# Patient Record
Sex: FEMALE | Race: BLACK OR AFRICAN AMERICAN | Employment: OTHER | URBAN - METROPOLITAN AREA
[De-identification: names, ages, dates, MRNs, and addresses within clinical notes are randomized per-mention and may not be internally consistent; named-entity substitution may affect disease eponyms.]

---

## 2019-01-29 ENCOUNTER — OFFICE VISIT (OUTPATIENT)
Dept: ENDOCRINOLOGY | Age: 69
End: 2019-01-29

## 2019-01-29 VITALS
HEART RATE: 99 BPM | SYSTOLIC BLOOD PRESSURE: 116 MMHG | BODY MASS INDEX: 22.13 KG/M2 | WEIGHT: 133 LBS | DIASTOLIC BLOOD PRESSURE: 71 MMHG

## 2019-01-29 DIAGNOSIS — I10 ESSENTIAL HYPERTENSION WITH GOAL BLOOD PRESSURE LESS THAN 130/80: ICD-10-CM

## 2019-01-29 DIAGNOSIS — E10.8 TYPE 1 DIABETES MELLITUS WITH COMPLICATION (HCC): Primary | ICD-10-CM

## 2019-01-29 DIAGNOSIS — E78.5 HYPERLIPIDEMIA, UNSPECIFIED HYPERLIPIDEMIA TYPE: ICD-10-CM

## 2019-01-29 LAB — HBA1C MFR BLD HPLC: 10.1 %

## 2019-01-29 RX ORDER — FUROSEMIDE 20 MG/1
TABLET ORAL
COMMUNITY
Start: 2018-11-29

## 2019-01-29 RX ORDER — LIDOCAINE HYDROCHLORIDE 20 MG/ML
SOLUTION ORAL; TOPICAL
COMMUNITY
Start: 2018-12-17

## 2019-01-29 RX ORDER — AMLODIPINE BESYLATE 10 MG/1
TABLET ORAL
COMMUNITY
Start: 2018-11-09

## 2019-01-29 RX ORDER — ROSUVASTATIN CALCIUM 20 MG/1
TABLET, COATED ORAL
COMMUNITY
Start: 2018-12-04

## 2019-01-29 RX ORDER — GABAPENTIN 100 MG/1
CAPSULE ORAL
COMMUNITY
Start: 2018-12-28

## 2019-01-29 RX ORDER — ONDANSETRON HYDROCHLORIDE 8 MG/1
TABLET, FILM COATED ORAL
COMMUNITY
Start: 2018-12-17

## 2019-01-29 RX ORDER — HUMAN INSULIN 100 [IU]/ML
INJECTION, SUSPENSION SUBCUTANEOUS
COMMUNITY
Start: 2018-11-01

## 2019-01-29 RX ORDER — ALBUTEROL SULFATE 0.83 MG/ML
SOLUTION RESPIRATORY (INHALATION)
COMMUNITY
Start: 2018-10-29

## 2019-01-29 RX ORDER — NAPROXEN 500 MG/1
TABLET ORAL
COMMUNITY
Start: 2019-01-25

## 2019-01-29 RX ORDER — PANTOPRAZOLE SODIUM 40 MG/1
TABLET, DELAYED RELEASE ORAL
COMMUNITY
Start: 2018-11-09

## 2019-01-29 RX ORDER — IPRATROPIUM BROMIDE 0.5 MG/2.5ML
SOLUTION RESPIRATORY (INHALATION)
COMMUNITY
Start: 2018-10-29

## 2019-01-29 NOTE — PATIENT INSTRUCTIONS
Novolin 70/30 insulin:     Breakfast 7-8 AM:      If sugar is 70 - 100: Take 15 units    If sugar is 101 - 150: Take 20 units   If sugar is over 150: Take 35 units    ( NO INSULIN BETWEEN BREAKFAST AND DINNER)    Dinner  6 PM (no later): If sugar is 70 - 100: Take 10 units   If sugar is 101 - 150: Take 20 units   If sugar is over 150: Take 35 units    --------  Correction Scale:  1 unit for every 25 above 150    IF GLUCOSE IS:                 THEN TAKE:      0   Extra Unit  151-175   1   Extra Unit  176-200   2   Extra Units  201-225   3   Extra Units  226-250   4   Extra Units  251-275   5   Extra Units  276-300   6   Extra Units  301-325   7   Extra Units    Example: My planned insulin dose:    ____ Units    +    ____ Extra Correction Units  =  ____ total units to take together as one injection. (PLEASE LOG SUGARS EXACTLY WHERE THEY GO ON THE SHEET SO WE CAN BETTER ADJUST YOUR INSULIN)        Please note our new policy, you must arrive to the clinic 15 minutes before your appointment time to allow enough time for proper check-in, adequate time to spend with your doctor, and also to respect the appointment time of the next patient. Not arriving 15 minutes in advance may result in having your appointment rescheduled for the next available day/time.  ----------------------------------------------------------------------------------------------------------------------    Below you will find a glucose log sheet which you can use to record your blood sugars. Without checking and recording what your home glucose levels are, it will be difficult to make any changes to your medication dose, even when significant changes may be needed. Please feel free to use the log below to record your home glucose levels. At the very least, I would like for you to login the entire 2-3 weeks just before your visit so we can make your visit much more productive and beneficial to you.      GLUCOSE LOG SHEET:    Date Breakfast Lunch Dinner Bedtime Comments ? GLUCOSE LOG SHEET:    Date Breakfast Lunch Dinner Bedtime Comments ? GLUCOSE LOG SHEET:    Date Breakfast Lunch Dinner Bedtime Comments ?

## 2019-01-29 NOTE — PROGRESS NOTES
CONSULTATION REQUESTED BY: Nazanin Christianson MD     REASON FOR CONSULT:  Uncontrolled type 1 diabetes    CHIEF COMPLAINT: evaluation of type 1 diabetes    HISTORY OF PRESENT ILLNESS:   Lorri Riojas is a 76 y.o. female with a PMHx as noted below who presents for evaluation of uncontrolled type 1 diabetes. Diabetes History:  Diabetes was diagnosed at age 12  Family History of diabetes is positive in her brother and 2 sisters,  Last A1c prior to initial visit is not certain, today is 51.2%    Current Home Regimen:  - Novolin 70/30 insulin: 35 units breakfast, 20-35 units dinner    Review of home glucose:  AM: 300's mostly  Lunch: 200's mostly, occasional 150  Dinner: 150-300 mostly  Bedtime: 100-115 past week, occasional 200's, though following AM always back up to 300's ? * Patient has been eating dinner at 8 PM and going to bed right after. She is logging her dinnertime numbers in the bedtime section which I am sure has caused much confusion with interpretation of the data. She is also taking insulin in between lunch and dinner, further confusing the results.      Review of most recent diabetes-related labs:  Lab Results   Component Value Date    HBA1C 6.6 (H) 12/06/2010    HBA1C 10.5 (H) 06/18/2010    CHOL 270 (H) 12/06/2010    LDLC 175 (H) 12/06/2010    GFRAA >59 12/06/2010    GFRNA >59 12/06/2010    MCACR 42 (H) 06/18/2010    TSH 1.640 12/06/2010     Lab Key:  [id number] = IA-2 pancreatic islet cell autoantibody  CPEPL = C-peptide level  :EXT = External Lab  GADLT = LALO-65 autoantibody   INSUL = Insulin level  MCACR (or MALBEXT) = Urine Microalbumin (or External UM)  B12LT = B12 level    PAST MEDICAL/SURGICAL HISTORY:   Past Medical History:   Diagnosis Date    CAD (coronary artery disease) 6/18/2010    Chronic pain     lower back    COPD (chronic obstructive pulmonary disease) ([facility] 8/27/2010    Diabetes ([facility]     type II    Diabetic gastroparesis ([facility] 7/7/2010    BDMZRTYB(000.0) 10/7/2010    HTN (hypertension) 6/18/2010    Other ill-defined conditions(799.89)     mixed hyperlipidemia     Past Surgical History:   Procedure Laterality Date    CARDIAC SURG PROCEDURE UNLIST      stent    CORONARY STENT SINGLE W/PTCA  9/21/2010         HX CHOLECYSTECTOMY  1976    HX GYN  1978    hysterectomy    HX GYN  1996    ovarian cyst removed    HX HERNIA REPAIR      LV ANGIOGRAPHY  9/21/2010         NEUROLOGICAL PROCEDURE UNLISTED  1997    disc fusion    ME LEFT HEART CATH,PERCUTANEOUS  9/21/2010            ALLERGIES:   Allergies   Allergen Reactions    Advil [Ibuprofen] Hives    Aleve [Naproxen Sodium] Hives       MEDICATIONS ON ADMISSION:     Current Outpatient Medications:     albuterol (PROVENTIL VENTOLIN) 2.5 mg /3 mL (0.083 %) nebulizer solution, , Disp: , Rfl:     amLODIPine (NORVASC) 10 mg tablet, , Disp: , Rfl:     furosemide (LASIX) 20 mg tablet, , Disp: , Rfl:     gabapentin (NEURONTIN) 100 mg capsule, , Disp: , Rfl:     NOVOLIN 70/30 U-100 INSULIN 100 unit/mL (70-30) injection, 35 AM 20 PM, Disp: , Rfl:     ipratropium (ATROVENT) 0.02 % soln, , Disp: , Rfl:     LIDOCAINE VISCOUS 2 % solution, , Disp: , Rfl:     naproxen (NAPROSYN) 500 mg tablet, , Disp: , Rfl:     pantoprazole (PROTONIX) 40 mg tablet, , Disp: , Rfl:     rosuvastatin (CRESTOR) 20 mg tablet, , Disp: , Rfl:     metoprolol (LOPRESSOR) 25 mg tablet, Take 1 Tab by mouth two (2) times a day., Disp: 60 Tab, Rfl: 6    clopidogrel (PLAVIX) 75 mg tablet, Take  by mouth daily. , Disp: , Rfl:     ezetimibe (ZETIA) 10 mg tablet, Take  by mouth., Disp: , Rfl:     atorvastatin (LIPITOR) 40 mg tablet, Take  by mouth daily. , Disp: , Rfl:     isosorbide mononitrate ER (IMDUR) 30 mg tablet, Take  by mouth daily. , Disp: , Rfl:     carisoprodol (SOMA) 350 mg tablet, Take 350 mg by mouth nightly., Disp: , Rfl:     promethazine-codeine (PHENERGAN WITH CODEINE) 6.25-10 mg/5 mL syrup, Take 5 mL by mouth every six (6) hours as needed for Cough., Disp: 120 mL, Rfl: 0    aspirin delayed-release 81 mg tablet, Take 2 Tabs by mouth daily. , Disp: 60 Tab, Rfl: 11    miconazole (MICOTIN) 2 % topical cream, Apply  to affected area two (2) times a day., Disp: 15 g, Rfl: 0    METHYL-B12/L-MFOLATE/B6 PHOS (METANX PO), Take  by mouth., Disp: , Rfl:     albuterol (PROVENTIL, VENTOLIN) 90 mcg/Actuation inhaler, Take 2 Puffs by inhalation every six (6) hours as needed. , Disp: 1 g, Rfl: 6    fluticasone (FLOVENT HFA) 110 mcg/Actuation inhaler, Take 1 Puff by inhalation two (2) times a day., Disp: 1 Inhaler, Rfl: 5    nitroglycerin (NITRO-DUR) 0.3 mg/hr, 1 Patch by TransDERmal route daily. , Disp: , Rfl:     potassium chloride SA (MICRO-K) 10 mEq capsule, Take 1 Cap by mouth two (2) times a day., Disp: 60 Cap, Rfl: 1    ERGOCALCIFEROL (VITAMIN D PO), Take  by mouth., Disp: , Rfl:     ondansetron hcl (ZOFRAN) 8 mg tablet, , Disp: , Rfl:     nitroglycerin (NITROQUICK) 0.3 mg SL tablet, 1 Tab by SubLINGual route every five (5) minutes as needed. , Disp: 1 Bottle, Rfl: 6    SOCIAL HISTORY:   Social History     Socioeconomic History    Marital status:      Spouse name: Not on file    Number of children: Not on file    Years of education: Not on file    Highest education level: Not on file   Social Needs    Financial resource strain: Not on file    Food insecurity - worry: Not on file    Food insecurity - inability: Not on file   [employer] needs - medical: Not on file   [employer] needs - non-medical: Not on file   Occupational History    Not on file   Tobacco Use    Smoking status: Former Smoker     Years: 30.00     Types: Cigarettes     Last attempt to quit: 2018     Years since quittin.4    Smokeless tobacco: Never Used    Tobacco comment: 3 cigs daily   Substance and Sexual Activity    Alcohol use: No    Drug use: No    Sexual activity: No   Other Topics Concern    Not on file   Social History Narrative    Not on file FAMILY HISTORY:  Family History   Problem Relation Age of Onset   Lakeisha.Hinds Cancer Mother         breast    Cancer Father         lung    Cancer Sister         breast       REVIEW OF SYSTEMS: Complete ROS assessed and noted for that which is described above, all else are negative. Eyes: normal  ENT: normal  CVS: normal  Resp: normal  GI: normal  : normal  GYN: normal  Endocrine: normal  Integument: normal  Musculoskeletal: normal  Neuro: normal  Psych: normal      PHYSICAL EXAMINATION:    VITAL SIGNS:  Visit Vitals  /71 (BP 1 Location: Left arm, BP Patient Position: Sitting)   Pulse 99   Wt 133 lb (60.3 kg)   BMI 22.13 kg/m²       GENERAL: NCAT, Sitting comfortably, NAD  EYES: EOMI, non-icteric, no proptosis  Ear/Nose/Throat: NCAT, no inflammation, no masses  LYMPH NODES: No LAD  CARDIOVASCULAR: S1 S2, RRR, No murmur, 2+ radial pulses  RESPIRATORY: CTA b/l, no wheeze/rales  GASTROINTESTINAL: NT, ND  MUSCULOSKELETAL: Normal ROM, no atrophy  SKIN: warm, no edema/rash/ or other skin changes  NEUROLOGIC: 5/5 power all extremities, no tremor, AAOx3  PSYCHIATRIC: Normal affect, Normal insight and judgement    Diabetic foot exam:     Left Foot:   Visual Exam: normal    Pulse DP: 2+ (normal)   Filament test: normal sensation    Vibratory sensation: Vibratory sensation: normal       Right Foot:   Visual Exam: normal    Pulse DP: 2+ (normal)   Filament test: normal sensation    Vibratory sensation: Vibratory sensation: normal      REVIEW OF LABORATORY AND RADIOLOGY DATA:   Labs and documentation have been reviewed as described above. ASSESSMENT AND PLAN:   Young Rojas is a 76 y.o. female with a PMHx as noted above who presents for evaluation of uncontrolled type 1 diabetes. Problems:  Type 1 diabetes Uncontrolled  Hyperlipidemia  Hypertension    We had the pleasure of reviewing together the basics of diabetes including basic pathophysiology and diabetes care.  We further discussed the importance of checking home glucose regularly and takin all of their scheduled medications in order to have the best possible outcome. I was able to answer any questions they had in clinic today and they are invited to reach me if they have any further questions. Based upon our discussion together today we have decided to make the following changes:    * Patient has been eating dinner at 8 PM and going to bed right after. She is logging her dinnertime numbers in the bedtime section which I am sure has caused much confusion with interpretation of the data. She is also taking insulin in between lunch and dinner, further confusing the results. With this finding, it is clear that the reason for her sugars in the AM being always in the 300's is due to her undertreating / holding her insulin at dinnertime frequently. I advised her not to take insulin between breakfast and dinner because this causes her sugars to come down low before her dinner and so she gets nervous and decides not to take the insulin then. She is aware that if her sugars are elevated between her breakfast and dinner, the correct step is to increase the breakfast dose, and we will also add a correction scale to her regimen. Finally, because of her concerns, I will also customize her dose based on a scale so that she is more comfortable with taking at least a reduced dose of insulin when her sugars are not high, as she needs to ensure treatment of her meal to avoid rebound post-prandial hyperglycemia. PLAN  Type 2 Diabetes  Medications:  Novolin 70/30 insulin:     Breakfast 7-8 AM:      If sugar is 70 - 100: Take 15 units    If sugar is 101 - 150: Take 20 units   If sugar is over 150: Take 35 units    ( NO INSULIN BETWEEN BREAKFAST AND DINNER)    Dinner  6 PM (no later): If sugar is 70 - 100: Take 10 units   If sugar is 101 - 150: Take 20 units   If sugar is over 150:  Take 35 units    Start Correction Scale:  1 unit for every 25 above 150  Provided with glucose log sheets for later review. HTN: BP stable on current medications, no changes today. HLD: Fasting lipids to be obtained/reviewed. Labs: 2019 DM panel will be obtained today in clinic,    RTC: I would like to see them back in 3 months. 60 minutes spent together with patient today of which >50% of this time was spent in counseling and coordination of care. Alex Snowden.  [address] [facility], [place] [facility]

## 2019-01-30 LAB
ALBUMIN SERPL-MCNC: 4.9 G/DL (ref 3.6–4.8)
ALBUMIN/CREAT UR: 48.5 MG/G CREAT (ref 0–30)
ALBUMIN/GLOB SERPL: 1.8 {RATIO} (ref 1.2–2.2)
ALP SERPL-CCNC: 116 IU/L (ref 39–117)
ALT SERPL-CCNC: 14 IU/L (ref 0–32)
AST SERPL-CCNC: 16 IU/L (ref 0–40)
BILIRUB SERPL-MCNC: 0.4 MG/DL (ref 0–1.2)
BUN SERPL-MCNC: 9 MG/DL (ref 8–27)
BUN/CREAT SERPL: 9 (ref 12–28)
CALCIUM SERPL-MCNC: 10.3 MG/DL (ref 8.7–10.3)
CHLORIDE SERPL-SCNC: 90 MMOL/L (ref 96–106)
CHOLEST SERPL-MCNC: 232 MG/DL (ref 100–199)
CO2 SERPL-SCNC: 27 MMOL/L (ref 20–29)
CREAT SERPL-MCNC: 1.02 MG/DL (ref 0.57–1)
CREAT UR-MCNC: 37.1 MG/DL
GLOBULIN SER CALC-MCNC: 2.7 G/DL (ref 1.5–4.5)
GLUCOSE SERPL-MCNC: 556 MG/DL (ref 65–99)
HDLC SERPL-MCNC: 62 MG/DL
INTERPRETATION, 910389: NORMAL
INTERPRETATION: NORMAL
LDLC SERPL CALC-MCNC: 143 MG/DL (ref 0–99)
MICROALBUMIN UR-MCNC: 18 UG/ML
PDF IMAGE, 910387: NORMAL
POTASSIUM SERPL-SCNC: 4 MMOL/L (ref 3.5–5.2)
PROT SERPL-MCNC: 7.6 G/DL (ref 6–8.5)
SODIUM SERPL-SCNC: 136 MMOL/L (ref 134–144)
TRIGL SERPL-MCNC: 135 MG/DL (ref 0–149)
VLDLC SERPL CALC-MCNC: 27 MG/DL (ref 5–40)

## 2019-01-31 NOTE — PROGRESS NOTES
DM labs reviewed, will send her a lab letter and forward a copy to her PCP,     Brooke Olivas.  [address] [facility]  [address] [place] [address]

## 2021-11-01 ENCOUNTER — HOSPITAL ENCOUNTER (EMERGENCY)
Age: 71
Discharge: STILL ADMITTED OR ADMINISTRATIVE | End: 2021-11-01

## 2022-02-10 ENCOUNTER — TELEPHONE (OUTPATIENT)
Dept: ENDOCRINOLOGY | Age: 72
End: 2022-02-10

## 2022-02-10 NOTE — TELEPHONE ENCOUNTER
2/10/2022    Kellie Shane from [facility] called and left a voicemail at 3:38pm stating that she needs Ms. South Pranay most recent visit notes faxed to her. The fax number is [phone]. She can be reached at [phone].

## 2023-02-10 ENCOUNTER — TRANSCRIBE ORDER (OUTPATIENT)
Dept: SCHEDULING | Age: 73
End: 2023-02-10

## 2023-02-10 DIAGNOSIS — R42 LIGHTHEADEDNESS: Primary | ICD-10-CM

## 2023-04-22 DIAGNOSIS — R42 LIGHTHEADEDNESS: Primary | ICD-10-CM

## 2025-01-03 ENCOUNTER — APPOINTMENT (OUTPATIENT)
Facility: HOSPITAL | Age: 75
End: 2025-01-03
Payer: MEDICARE

## 2025-01-03 ENCOUNTER — HOSPITAL ENCOUNTER (INPATIENT)
Facility: HOSPITAL | Age: 75
LOS: 7 days | Discharge: HOME HEALTH | End: 2025-01-10
Attending: EMERGENCY MEDICINE | Admitting: INTERNAL MEDICINE
Payer: MEDICARE

## 2025-01-03 ENCOUNTER — HOSPITAL ENCOUNTER (EMERGENCY)
Facility: HOSPITAL | Age: 75
Discharge: HOME | End: 2025-01-06
Payer: MEDICARE

## 2025-01-03 DIAGNOSIS — R25.1 LIMB TREMOR: ICD-10-CM

## 2025-01-03 DIAGNOSIS — R29.810 FACIAL DROOP: Primary | ICD-10-CM

## 2025-01-03 DIAGNOSIS — R47.1 DYSARTHRIA: ICD-10-CM

## 2025-01-03 LAB
ALBUMIN SERPL-MCNC: 3.7 G/DL (ref 3.5–5)
ALBUMIN/GLOB SERPL: 0.9 (ref 1.1–2.2)
ALP SERPL-CCNC: 146 U/L (ref 45–117)
ALT SERPL-CCNC: 21 U/L (ref 12–78)
ANION GAP SERPL CALC-SCNC: 4 MMOL/L (ref 2–12)
AST SERPL-CCNC: 26 U/L (ref 15–37)
BASOPHILS # BLD: 0.1 K/UL (ref 0–0.1)
BASOPHILS NFR BLD: 1 % (ref 0–1)
BILIRUB SERPL-MCNC: 0.4 MG/DL (ref 0.2–1)
BUN SERPL-MCNC: 13 MG/DL (ref 6–20)
BUN/CREAT SERPL: 17 (ref 12–20)
CALCIUM SERPL-MCNC: 9.6 MG/DL (ref 8.5–10.1)
CHLORIDE SERPL-SCNC: 104 MMOL/L (ref 97–108)
CO2 SERPL-SCNC: 31 MMOL/L (ref 21–32)
CREAT SERPL-MCNC: 0.75 MG/DL (ref 0.55–1.02)
DIFFERENTIAL METHOD BLD: ABNORMAL
EKG ATRIAL RATE: 74 BPM
EKG DIAGNOSIS: NORMAL
EKG P AXIS: 73 DEGREES
EKG P-R INTERVAL: 158 MS
EKG Q-T INTERVAL: 388 MS
EKG QRS DURATION: 70 MS
EKG QTC CALCULATION (BAZETT): 430 MS
EKG R AXIS: 38 DEGREES
EKG T AXIS: 62 DEGREES
EKG VENTRICULAR RATE: 74 BPM
EOSINOPHIL # BLD: 0.3 K/UL (ref 0–0.4)
EOSINOPHIL NFR BLD: 5 % (ref 0–7)
ERYTHROCYTE [DISTWIDTH] IN BLOOD BY AUTOMATED COUNT: 13.3 % (ref 11.5–14.5)
GLOBULIN SER CALC-MCNC: 3.9 G/DL (ref 2–4)
GLUCOSE BLD STRIP.AUTO-MCNC: 127 MG/DL (ref 65–117)
GLUCOSE BLD STRIP.AUTO-MCNC: 148 MG/DL (ref 65–117)
GLUCOSE SERPL-MCNC: 197 MG/DL (ref 65–100)
HCT VFR BLD AUTO: 37.5 % (ref 35–47)
HGB BLD-MCNC: 12.5 G/DL (ref 11.5–16)
IMM GRANULOCYTES # BLD AUTO: 0 K/UL (ref 0–0.04)
IMM GRANULOCYTES NFR BLD AUTO: 0 % (ref 0–0.5)
INR PPP: 1 (ref 0.9–1.1)
LYMPHOCYTES # BLD: 1.4 K/UL (ref 0.8–3.5)
LYMPHOCYTES NFR BLD: 21 % (ref 12–49)
MAGNESIUM SERPL-MCNC: 2 MG/DL (ref 1.6–2.4)
MCH RBC QN AUTO: 28.2 PG (ref 26–34)
MCHC RBC AUTO-ENTMCNC: 33.3 G/DL (ref 30–36.5)
MCV RBC AUTO: 84.5 FL (ref 80–99)
MONOCYTES # BLD: 0.5 K/UL (ref 0–1)
MONOCYTES NFR BLD: 7 % (ref 5–13)
NEUTS SEG # BLD: 4.5 K/UL (ref 1.8–8)
NEUTS SEG NFR BLD: 67 % (ref 32–75)
NRBC # BLD: 0 K/UL (ref 0–0.01)
NRBC BLD-RTO: 0 PER 100 WBC
PLATELET # BLD AUTO: 129 K/UL (ref 150–400)
PMV BLD AUTO: 12 FL (ref 8.9–12.9)
POTASSIUM SERPL-SCNC: 4.5 MMOL/L (ref 3.5–5.1)
PROT SERPL-MCNC: 7.6 G/DL (ref 6.4–8.2)
PROTHROMBIN TIME: 10.8 SEC (ref 9–11.1)
RBC # BLD AUTO: 4.44 M/UL (ref 3.8–5.2)
SERVICE CMNT-IMP: ABNORMAL
SERVICE CMNT-IMP: ABNORMAL
SODIUM SERPL-SCNC: 139 MMOL/L (ref 136–145)
TROPONIN I SERPL HS-MCNC: 8 NG/L (ref 0–51)
WBC # BLD AUTO: 6.8 K/UL (ref 3.6–11)

## 2025-01-03 PROCEDURE — 93010 ELECTROCARDIOGRAM REPORT: CPT | Performed by: INTERNAL MEDICINE

## 2025-01-03 PROCEDURE — 2060000000 HC ICU INTERMEDIATE R&B

## 2025-01-03 PROCEDURE — 6360000004 HC RX CONTRAST MEDICATION: Performed by: EMERGENCY MEDICINE

## 2025-01-03 PROCEDURE — 70496 CT ANGIOGRAPHY HEAD: CPT

## 2025-01-03 PROCEDURE — 6370000000 HC RX 637 (ALT 250 FOR IP): Performed by: INTERNAL MEDICINE

## 2025-01-03 PROCEDURE — 70450 CT HEAD/BRAIN W/O DYE: CPT

## 2025-01-03 PROCEDURE — 83036 HEMOGLOBIN GLYCOSYLATED A1C: CPT

## 2025-01-03 PROCEDURE — 83735 ASSAY OF MAGNESIUM: CPT

## 2025-01-03 PROCEDURE — 80053 COMPREHEN METABOLIC PANEL: CPT

## 2025-01-03 PROCEDURE — 71045 X-RAY EXAM CHEST 1 VIEW: CPT

## 2025-01-03 PROCEDURE — 82962 GLUCOSE BLOOD TEST: CPT

## 2025-01-03 PROCEDURE — 84484 ASSAY OF TROPONIN QUANT: CPT

## 2025-01-03 PROCEDURE — 93005 ELECTROCARDIOGRAM TRACING: CPT | Performed by: EMERGENCY MEDICINE

## 2025-01-03 PROCEDURE — 6360000002 HC RX W HCPCS: Performed by: NURSE PRACTITIONER

## 2025-01-03 PROCEDURE — 85610 PROTHROMBIN TIME: CPT

## 2025-01-03 PROCEDURE — 85025 COMPLETE CBC W/AUTO DIFF WBC: CPT

## 2025-01-03 PROCEDURE — 36415 COLL VENOUS BLD VENIPUNCTURE: CPT

## 2025-01-03 PROCEDURE — 99285 EMERGENCY DEPT VISIT HI MDM: CPT

## 2025-01-03 RX ORDER — ONDANSETRON 2 MG/ML
4 INJECTION INTRAMUSCULAR; INTRAVENOUS EVERY 6 HOURS PRN
Status: DISCONTINUED | OUTPATIENT
Start: 2025-01-03 | End: 2025-01-10 | Stop reason: HOSPADM

## 2025-01-03 RX ORDER — HYDRALAZINE HYDROCHLORIDE 20 MG/ML
10 INJECTION INTRAMUSCULAR; INTRAVENOUS EVERY 6 HOURS PRN
Status: DISCONTINUED | OUTPATIENT
Start: 2025-01-03 | End: 2025-01-10 | Stop reason: HOSPADM

## 2025-01-03 RX ORDER — POLYETHYLENE GLYCOL 3350 17 G/17G
17 POWDER, FOR SOLUTION ORAL DAILY PRN
Status: DISCONTINUED | OUTPATIENT
Start: 2025-01-03 | End: 2025-01-10 | Stop reason: HOSPADM

## 2025-01-03 RX ORDER — INSULIN LISPRO 100 [IU]/ML
0-4 INJECTION, SOLUTION INTRAVENOUS; SUBCUTANEOUS
Status: DISCONTINUED | OUTPATIENT
Start: 2025-01-03 | End: 2025-01-10 | Stop reason: HOSPADM

## 2025-01-03 RX ORDER — SODIUM CHLORIDE 0.9 % (FLUSH) 0.9 %
5-40 SYRINGE (ML) INJECTION EVERY 12 HOURS SCHEDULED
Status: DISCONTINUED | OUTPATIENT
Start: 2025-01-03 | End: 2025-01-10 | Stop reason: HOSPADM

## 2025-01-03 RX ORDER — SODIUM CHLORIDE 0.9 % (FLUSH) 0.9 %
5-40 SYRINGE (ML) INJECTION PRN
Status: DISCONTINUED | OUTPATIENT
Start: 2025-01-03 | End: 2025-01-10 | Stop reason: HOSPADM

## 2025-01-03 RX ORDER — ASPIRIN 81 MG/1
81 TABLET, CHEWABLE ORAL DAILY
Status: DISCONTINUED | OUTPATIENT
Start: 2025-01-03 | End: 2025-01-10 | Stop reason: HOSPADM

## 2025-01-03 RX ORDER — IOPAMIDOL 755 MG/ML
100 INJECTION, SOLUTION INTRAVASCULAR
Status: COMPLETED | OUTPATIENT
Start: 2025-01-03 | End: 2025-01-03

## 2025-01-03 RX ORDER — ONDANSETRON 4 MG/1
4 TABLET, ORALLY DISINTEGRATING ORAL EVERY 8 HOURS PRN
Status: DISCONTINUED | OUTPATIENT
Start: 2025-01-03 | End: 2025-01-10 | Stop reason: HOSPADM

## 2025-01-03 RX ORDER — LORAZEPAM 2 MG/ML
0.5 INJECTION INTRAMUSCULAR ONCE
Status: COMPLETED | OUTPATIENT
Start: 2025-01-03 | End: 2025-01-03

## 2025-01-03 RX ORDER — ENOXAPARIN SODIUM 100 MG/ML
40 INJECTION SUBCUTANEOUS DAILY
Status: DISCONTINUED | OUTPATIENT
Start: 2025-01-03 | End: 2025-01-10 | Stop reason: HOSPADM

## 2025-01-03 RX ORDER — ATORVASTATIN CALCIUM 20 MG/1
80 TABLET, FILM COATED ORAL NIGHTLY
Status: DISCONTINUED | OUTPATIENT
Start: 2025-01-03 | End: 2025-01-10 | Stop reason: HOSPADM

## 2025-01-03 RX ORDER — SODIUM CHLORIDE 9 MG/ML
INJECTION, SOLUTION INTRAVENOUS PRN
Status: DISCONTINUED | OUTPATIENT
Start: 2025-01-03 | End: 2025-01-10 | Stop reason: HOSPADM

## 2025-01-03 RX ADMIN — IOPAMIDOL 85 ML: 755 INJECTION, SOLUTION INTRAVENOUS at 14:44

## 2025-01-03 RX ADMIN — ATORVASTATIN CALCIUM 80 MG: 20 TABLET, FILM COATED ORAL at 22:39

## 2025-01-03 RX ADMIN — LORAZEPAM 0.5 MG: 2 INJECTION INTRAMUSCULAR; INTRAVENOUS at 22:39

## 2025-01-03 RX ADMIN — ASPIRIN 81 MG: 81 TABLET, CHEWABLE ORAL at 22:47

## 2025-01-03 ASSESSMENT — PAIN SCALES - GENERAL
PAINLEVEL_OUTOF10: 0
PAINLEVEL_OUTOF10: 0
PAINLEVEL_OUTOF10: 4

## 2025-01-03 ASSESSMENT — LIFESTYLE VARIABLES
HOW MANY STANDARD DRINKS CONTAINING ALCOHOL DO YOU HAVE ON A TYPICAL DAY: PATIENT DOES NOT DRINK
HOW OFTEN DO YOU HAVE A DRINK CONTAINING ALCOHOL: NEVER

## 2025-01-03 NOTE — ED TRIAGE NOTES
Son brings in patient for complaints of possible stroke. Reports that on 12/31 patient began to have slurred speech, left sided facial droop, headache, and difficulty swallowing.     Reports that she takes a daily aspirin.     States that she had a stroke 2-3 years ago with right sided weakness.

## 2025-01-03 NOTE — H&P
[facility]  [address] [place], [place] [id number] [phone]    Admission History and Physical      NAME:  Amanda Barkley   :   1950   MRN:  [id number]     PCP:  Young Winston MD     Date/Time of service:  1/3/2025  6:38 PM        Subjective:     CHIEF COMPLAINT: slurred speech, difficulty swallowing.     HISTORY OF PRESENT ILLNESS:     Ms. Barkley is a 74 y.o. female with a Pmhx of coronary disease, hypertension, hyperlipidemia, diabetes, COPD on 2 L nasal cannula as needed, who is admitted with started with slurred speech, difficulty swallowing, blurry vision.  Ms. Barkley is accompanied by her son who helps provide history.  He states that on Monday so 2024 they noticed patient had started to develop speech changes.  Patient also notes that she has blurry vision in her left eye; she also notes tearing of the left eye.  Family also notes a left-sided facial droop.  Patient also states that she is having difficult time swallowing.  She states that she has been experiencing some chills and thinks she may have had an elevated temperature at home.  She denies any nausea vomiting or diarrhea.  She states that she has chronic numbness tingling due to her neuropathy.    Allergies   Allergen Reactions    Nsaids      Unknown          Prior to Admission medications    Not on File       Past Medical History:   Diagnosis Date    CAD (coronary artery disease) 2010    Chronic pain     lower back    COPD (chronic obstructive pulmonary disease) (HCC) 2010    Diabetes (HCC)     type II    Diabetic gastroparesis ([facility]) 2010    Headache(784.0) 10/7/2010    HTN (hypertension) 2010    Other ill-defined conditions(799.89)     mixed hyperlipidemia        Past Surgical History:   Procedure Laterality Date    CHOLECYSTECTOMY      CORONARY STENT SINGLE W/PTCA  2010         GYN      ovarian cyst removed    GYN      hysterectomy     HERNIA REPAIR      LEFT HEART CATH,PERCUTANEOUS  2010         LV ANGIOGRAPHY  2010         NEUROLOGICAL SURGERY      disc fusion    SC UNLISTED PROCEDURE CARDIAC SURGERY      stent       Social History     Tobacco Use    Smoking status: Former     Current packs/day: 0.00     Types: Cigarettes     Quit date: 2018     Years since quittin.3    Smokeless tobacco: Never    Tobacco comments:     Quit smoking: 3 cigs daily   Substance Use Topics    Alcohol use: No        Family History   Problem Relation Age of Onset    Cancer Mother         breast    Cancer Father         lung    Cancer Sister         breast        Review of Systems:  Per HPI          Objective:      VITALS:    Vital signs reviewed; most recent are:    Vitals:    25 1700   BP: (!) 184/68   Pulse: 70   Resp: 12   Temp:    SpO2: 95%     SpO2 Readings from Last 6 Encounters:   25 95%        No intake or output data in the 24 hours ending 25 1838     Exam:     Physical Exam:    Gen:  Well-developed, well-nourished, in no acute distress  HEENT:   hearing intact to voice, tearing of left eye   Resp:  No accessory muscle use, wheeze bl   Card:  RRR, No murmurs, normal S1, S2, no peripheral edema  Abd:  Soft, non-tender, non-distended, normoactive bowel sounds are present  Musc:  No cyanosis or clubbing  Skin:  No rashes or ulcers, skin turgor is good  Neuro:  Cranial nerves 3-12 are grossly intact,  follows commands appropriately, diffuse weakness   Psych:  Oriented to person, place, and time, Alert with good insight      Labs:    Recent Labs     25  1327   WBC 6.8   HGB 12.5   HCT 37.5   *     Recent Labs     25  1327      K 4.5      CO2 31   BUN 13   MG 2.0   ALT 21     No results found for: \"GLUCPOC\"  No results for input(s): \"PH\", \"PCO2\", \"PO2\", \"HCO3\", \"FIO2\" in the last 72 hours.  Recent Labs     25  1327   INR 1.0       Radiology and EKG reviewed:   CT with no acute intracranial

## 2025-01-03 NOTE — ED PROVIDER NOTES
[facility]  EMERGENCY DEPARTMENT ENCOUNTER      Pt Name: Amanda Barkley  MRN: [id number]  Birthdate 1950  Date of evaluation: 1/3/2025  Provider: Torin Sexton MD      HISTORY OF PRESENT ILLNESS      74-year-old -American female with a history of coronary artery disease, diabetes presenting to the ER for strokelike symptoms.  Family reports that she was last seen well last Friday.  When family saw her on 12/31 she had developed possible strokelike symptoms.  She had speech changes left-sided facial drooping and increased tremor.  Family reports that she always has some type of tremor and is followed by neurology.  They cannot remember the name of her diagnosis.  But states that the tremor has been worse.  Patient reports left-sided headache and neck pain              Nursing Notes were reviewed.    REVIEW OF SYSTEMS         Review of Systems   Constitutional:  Negative for chills and fever.   HENT:  Negative for congestion and sore throat.    Respiratory:  Negative for chest tightness and shortness of breath.    Cardiovascular:  Negative for chest pain and leg swelling.   Gastrointestinal:  Negative for abdominal pain and vomiting.   Genitourinary:  Negative for dysuria.   Musculoskeletal:  Negative for back pain.   Skin:  Negative for rash.   Neurological:  Positive for tremors, facial asymmetry, speech difficulty and headaches.   All other systems reviewed and are negative.          PAST MEDICAL HISTORY     Past Medical History:   Diagnosis Date    CAD (coronary artery disease) 6/18/2010    Chronic pain     lower back    COPD (chronic obstructive pulmonary disease) (HCC) 8/27/2010    Diabetes ([facility])     type II    Diabetic gastroparesis ([facility]) 7/7/2010    Headache(784.0) 10/7/2010    HTN (hypertension) 6/18/2010    Other ill-defined conditions(799.89)     mixed hyperlipidemia         SURGICAL HISTORY       Past Surgical History:   Procedure Laterality Date     History    Marital status:      Spouse name: None    Number of children: None    Years of education: None    Highest education level: None   Tobacco Use    Smoking status: Former     Current packs/day: 0.00     Types: Cigarettes     Quit date: 2018     Years since quittin.3    Smokeless tobacco: Never    Tobacco comments:     Quit smoking: 3 cigs daily   Substance and Sexual Activity    Alcohol use: No    Drug use: No     Social Determinants of Health     Food Insecurity: No Food Insecurity (1/3/2025)    Hunger Vital Sign     Worried About Running Out of Food in the Last Year: Never true     Ran Out of Food in the Last Year: Never true   Transportation Needs: No Transportation Needs (1/3/2025)    PRAPARE - Transportation     Lack of Transportation (Medical): No     Lack of Transportation (Non-Medical): No   Housing Stability: Low Risk  (1/3/2025)    Housing Stability Vital Sign     Unable to Pay for Housing in the Last Year: No     Number of Times Moved in the Last Year: 0     Homeless in the Last Year: No         PHYSICAL EXAM       ED Triage Vitals [25 1241]   BP Systolic BP Percentile Diastolic BP Percentile Temp Temp Source Pulse Respirations SpO2   (!) 177/126 -- -- 97.7 °F (36.5 °C) Oral 99 16 99 %      Height Weight         -- --             Body mass index is 26.18 kg/m².    Physical Exam  Vitals and nursing note reviewed.   Constitutional:       Appearance: Normal appearance.   HENT:      Head: Normocephalic and atraumatic.      Nose: Nose normal.      Mouth/Throat:      Mouth: Mucous membranes are moist.      Pharynx: No oropharyngeal exudate.   Eyes:      Comments: Pupils 3 mm and reactive bilaterally.  Patient has some limitation in extraocular movements bilaterally, they are very jerky   Cardiovascular:      Rate and Rhythm: Normal rate and regular rhythm.   Pulmonary:      Effort: Pulmonary effort is normal. No respiratory distress.      Breath sounds: Normal breath sounds.

## 2025-01-04 LAB
ANION GAP SERPL CALC-SCNC: 6 MMOL/L (ref 2–12)
BUN SERPL-MCNC: 11 MG/DL (ref 6–20)
BUN/CREAT SERPL: 17 (ref 12–20)
CALCIUM SERPL-MCNC: 9.4 MG/DL (ref 8.5–10.1)
CHLORIDE SERPL-SCNC: 104 MMOL/L (ref 97–108)
CHOLEST SERPL-MCNC: 237 MG/DL
CO2 SERPL-SCNC: 28 MMOL/L (ref 21–32)
CREAT SERPL-MCNC: 0.63 MG/DL (ref 0.55–1.02)
ERYTHROCYTE [DISTWIDTH] IN BLOOD BY AUTOMATED COUNT: 13.2 % (ref 11.5–14.5)
EST. AVERAGE GLUCOSE BLD GHB EST-MCNC: 177 MG/DL
GLUCOSE BLD STRIP.AUTO-MCNC: 149 MG/DL (ref 65–117)
GLUCOSE BLD STRIP.AUTO-MCNC: 174 MG/DL (ref 65–117)
GLUCOSE BLD STRIP.AUTO-MCNC: 208 MG/DL (ref 65–117)
GLUCOSE BLD STRIP.AUTO-MCNC: 222 MG/DL (ref 65–117)
GLUCOSE SERPL-MCNC: 111 MG/DL (ref 65–100)
HBA1C MFR BLD: 7.8 % (ref 4–5.6)
HCT VFR BLD AUTO: 38.1 % (ref 35–47)
HDLC SERPL-MCNC: 62 MG/DL
HDLC SERPL: 3.8 (ref 0–5)
HGB BLD-MCNC: 12.6 G/DL (ref 11.5–16)
LDLC SERPL CALC-MCNC: 156.6 MG/DL (ref 0–100)
MCH RBC QN AUTO: 27.7 PG (ref 26–34)
MCHC RBC AUTO-ENTMCNC: 33.1 G/DL (ref 30–36.5)
MCV RBC AUTO: 83.7 FL (ref 80–99)
NRBC # BLD: 0 K/UL (ref 0–0.01)
NRBC BLD-RTO: 0 PER 100 WBC
PLATELET # BLD AUTO: 119 K/UL (ref 150–400)
PMV BLD AUTO: 11.3 FL (ref 8.9–12.9)
POTASSIUM SERPL-SCNC: 3.4 MMOL/L (ref 3.5–5.1)
RBC # BLD AUTO: 4.55 M/UL (ref 3.8–5.2)
SERVICE CMNT-IMP: ABNORMAL
SODIUM SERPL-SCNC: 138 MMOL/L (ref 136–145)
TRIGL SERPL-MCNC: 92 MG/DL
VLDLC SERPL CALC-MCNC: 18.4 MG/DL
WBC # BLD AUTO: 7.7 K/UL (ref 3.6–11)

## 2025-01-04 PROCEDURE — 99223 1ST HOSP IP/OBS HIGH 75: CPT | Performed by: PSYCHIATRY & NEUROLOGY

## 2025-01-04 PROCEDURE — 80061 LIPID PANEL: CPT

## 2025-01-04 PROCEDURE — 2500000003 HC RX 250 WO HCPCS: Performed by: INTERNAL MEDICINE

## 2025-01-04 PROCEDURE — 6370000000 HC RX 637 (ALT 250 FOR IP): Performed by: NURSE PRACTITIONER

## 2025-01-04 PROCEDURE — 80048 BASIC METABOLIC PNL TOTAL CA: CPT

## 2025-01-04 PROCEDURE — 94761 N-INVAS EAR/PLS OXIMETRY MLT: CPT

## 2025-01-04 PROCEDURE — 82962 GLUCOSE BLOOD TEST: CPT

## 2025-01-04 PROCEDURE — 6370000000 HC RX 637 (ALT 250 FOR IP): Performed by: INTERNAL MEDICINE

## 2025-01-04 PROCEDURE — 85027 COMPLETE CBC AUTOMATED: CPT

## 2025-01-04 PROCEDURE — 2060000000 HC ICU INTERMEDIATE R&B

## 2025-01-04 PROCEDURE — 92610 EVALUATE SWALLOWING FUNCTION: CPT

## 2025-01-04 PROCEDURE — 4A03X5D MEASUREMENT OF ARTERIAL FLOW, INTRACRANIAL, EXTERNAL APPROACH: ICD-10-PCS | Performed by: STUDENT IN AN ORGANIZED HEALTH CARE EDUCATION/TRAINING PROGRAM

## 2025-01-04 RX ORDER — ACETAMINOPHEN 325 MG/1
650 TABLET ORAL EVERY 4 HOURS PRN
Status: DISCONTINUED | OUTPATIENT
Start: 2025-01-04 | End: 2025-01-10 | Stop reason: HOSPADM

## 2025-01-04 RX ADMIN — ACETAMINOPHEN 650 MG: 325 TABLET ORAL at 11:19

## 2025-01-04 RX ADMIN — ASPIRIN 81 MG: 81 TABLET, CHEWABLE ORAL at 08:37

## 2025-01-04 RX ADMIN — SODIUM CHLORIDE, PRESERVATIVE FREE 10 ML: 5 INJECTION INTRAVENOUS at 21:05

## 2025-01-04 RX ADMIN — ONDANSETRON 4 MG: 4 TABLET, ORALLY DISINTEGRATING ORAL at 03:07

## 2025-01-04 RX ADMIN — ACETAMINOPHEN 650 MG: 325 TABLET ORAL at 21:12

## 2025-01-04 RX ADMIN — INSULIN LISPRO 1 UNITS: 100 INJECTION, SOLUTION INTRAVENOUS; SUBCUTANEOUS at 11:11

## 2025-01-04 RX ADMIN — SODIUM CHLORIDE, PRESERVATIVE FREE 10 ML: 5 INJECTION INTRAVENOUS at 08:39

## 2025-01-04 RX ADMIN — ATORVASTATIN CALCIUM 80 MG: 20 TABLET, FILM COATED ORAL at 21:04

## 2025-01-04 RX ADMIN — INSULIN LISPRO 1 UNITS: 100 INJECTION, SOLUTION INTRAVENOUS; SUBCUTANEOUS at 21:04

## 2025-01-04 RX ADMIN — ACETAMINOPHEN 650 MG: 325 TABLET ORAL at 05:51

## 2025-01-04 ASSESSMENT — PAIN SCALES - GENERAL
PAINLEVEL_OUTOF10: 0
PAINLEVEL_OUTOF10: 0
PAINLEVEL_OUTOF10: 6
PAINLEVEL_OUTOF10: 3
PAINLEVEL_OUTOF10: 5
PAINLEVEL_OUTOF10: 9

## 2025-01-04 ASSESSMENT — PAIN DESCRIPTION - FREQUENCY: FREQUENCY: INTERMITTENT

## 2025-01-04 ASSESSMENT — PAIN DESCRIPTION - PAIN TYPE: TYPE: ACUTE PAIN

## 2025-01-04 ASSESSMENT — PAIN DESCRIPTION - LOCATION
LOCATION: HEAD
LOCATION: HEAD

## 2025-01-04 ASSESSMENT — PAIN DESCRIPTION - DESCRIPTORS
DESCRIPTORS: ACHING
DESCRIPTORS: THROBBING

## 2025-01-04 ASSESSMENT — PAIN - FUNCTIONAL ASSESSMENT: PAIN_FUNCTIONAL_ASSESSMENT: ACTIVITIES ARE NOT PREVENTED

## 2025-01-04 ASSESSMENT — PAIN DESCRIPTION - ONSET: ONSET: AWAKENED FROM SLEEP

## 2025-01-04 ASSESSMENT — PAIN DESCRIPTION - ORIENTATION: ORIENTATION: MID

## 2025-01-04 NOTE — CONSULTS
INPATIENT NEUROLOGY CONSULT NOTE    NAME:     Amanda Barkley    ADMIT DATE:    1/3/2025 12:35 PM    CONSULT DATE:  01/04/25    REQUESTING PROVIDER: Brenda Wall DO      HPI: 74 y.o. female who  has a past medical history of CAD (coronary artery disease), Chronic pain, COPD (chronic obstructive pulmonary disease) (HCC), Diabetes (HCC), Diabetic gastroparesis (HCC), Headache(784.0), HTN (hypertension), and Other ill-defined conditions(799.89).    Neurology is asked to evaluate to patient for: dysarthria, clonus     Pertinent home meds include: no home meds listed    Hx from chart review and discussion with patient. Pt brought in by Son who reported that on 12-31-24 she began to have slurred speech, left side facial droop, headache, and difficulty swallowing. Pt was able to relay that she had stroke 2-3 yrs ago with right side weakness (resolved or residual?). Additional symptoms were: blurry vision in left eye, tearing left eye, difficulty swallowing, chills, no definite fever.     Pt tells me she woke up yesterday morning and left eye felt like it had something in it/ irritating, tearing from left eye, left eye droopy. Noticed when she tried to drink something it leaked out of left corner of mouth.  Also reports starting to have left sided head pain, sharp, shooting that extended down to left side of throat.      Separately, tells me that she's had a tremor issue for some time, has been seeing a Neurologist [facility] and was told that it was not typical Parkinson's but it may be some variation of parkinson's (she describes orthostatic tremors).  And that she has mild residual right side weakness from prior stroke      CTA head/ neck PRELIM report: There is no major central intracranial vascular occlusion.  The carotid arteries are patent. The middle cerebral arteries and the M1 branches are patent. Nonvisualization of the right vertebral artery with reconstitution just  Physician / QHP:     [x] Yes  Discussed with Dr Cid via [employer] (secure messaging)     C) Risk of Complication, Morbidity, or Mortality:     Escalation of hospital level of care (High Risk):  [] Yes [] No  Decision to de-escalate care or DNR due to poor prognosis (High Risk):     [] Yes   [] No  Parenteral controlled substances prescribed (High Risk):     [] Yes [] No  Drug Therapy requiring intensive monitoring for toxicity (High Risk)  [] Yes   [] No  Prescription drug management (Moderate Risk):       [] Yes     [] No  Decision regarding minor surgery (Moderate Risk):      [] Yes      [] No

## 2025-01-04 NOTE — PROGRESS NOTES
Occupational Therapy Note:  Orders acknowledged and chart reviewed.  Patient receiving tx from additional services when attempting 2x this afternoon.  Will continue to follow.  Ydaira Eagle, OTR/L

## 2025-01-04 NOTE — PROGRESS NOTES
Physical Therapy Note:    PT evaluation deferred. Pt was receiving treatment from another service at time of attempt and was not available to participate. Will follow.    Amber Kaplan, PT, DPT, GEORGES

## 2025-01-04 NOTE — PLAN OF CARE
Problem: Safety - Adult  Goal: Free from fall injury  1/4/2025 0941 by Paula Dodson RN  Outcome: Progressing  1/3/2025 2249 by Abhinav Piper RN  Outcome: Progressing     Problem: Pain  Goal: Verbalizes/displays adequate comfort level or baseline comfort level  1/4/2025 0941 by Paula Dodson RN  Outcome: Progressing  1/3/2025 2249 by Abhinav Piper RN  Outcome: Progressing     Problem: Chronic Conditions and Co-morbidities  Goal: Patient's chronic conditions and co-morbidity symptoms are monitored and maintained or improved  1/3/2025 2249 by Abhinav Piper RN  Outcome: Progressing  Flowsheets (Taken 1/3/2025 2200)  Care Plan - Patient's Chronic Conditions and Co-Morbidity Symptoms are Monitored and Maintained or Improved: Monitor and assess patient's chronic conditions and comorbid symptoms for stability, deterioration, or improvement

## 2025-01-04 NOTE — PLAN OF CARE
Speech LAnguage Pathology EVALUATION    Patient: Amanda Barkley (74 y.o. female)  Date: 1/4/2025  Primary Diagnosis: Dysarthria [R47.1]  Limb tremor [R25.1]  Facial droop [R29.810]       Precautions:            aspiration         ASSESSMENT :  Patient with c/o L facial numbness, and mild-moderate oral-pharyngeal  dysphagia.  Oral issues: leakage, poor suck, premature spillage to pharynx  Pharyngeal issues: appeared to have discoordinated swallow, coughing with thins or mixing of consistencies.   She has h/o esophageal dilatation.  Minimal dysarthria.   Admitted 1-3-25 with 4 days of dysarthria, L facial droop,  headache and dysphagia, vision issues. 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   PMH: CAD, HTN< XOL, DM, COPD on 2L NC at home,  CVA 2-3 years ago with R weakness, tremor ?PD w/u in progress as OP. She usually goes to [facility]. Had discharge in Sept and walks to bathroom with cane or rollator.       Patient will benefit from skilled intervention to address the above impairments.     PLAN :  Recommendations and Planned Interventions:  Diet: Regular and mildly thick liquids  Upright for all PO  Meds in ap sauce         Acute SLP Services:  . Patient's rehabilitation potential is considered to be Fair.  Discharge Recommendations: Yes, recommend SLP treatment at next level of care     SUBJECTIVE:   Patient stated, “I don't usually swallow this much.”    OBJECTIVE:     Past Medical History:   Diagnosis Date    CAD (coronary artery disease) 6/18/2010    Chronic pain     lower back    COPD (chronic obstructive pulmonary disease) (HCC) 8/27/2010    Diabetes (HCC)     type II    Diabetic gastroparesis (HCC) 7/7/2010    Headache(784.0) 10/7/2010    HTN (hypertension) 6/18/2010    Other ill-defined conditions(799.89)     mixed hyperlipidemia     Past Surgical History:   Procedure Laterality Date    CHOLECYSTECTOMY  1976    CORONARY STENT SINGLE W/PTCA  9/21/2010         GYN  1996    ovarian cyst removed    GYN

## 2025-01-04 NOTE — PLAN OF CARE
Problem: Chronic Conditions and Co-morbidities  Goal: Patient's chronic conditions and co-morbidity symptoms are monitored and maintained or improved  Outcome: Progressing  Flowsheets (Taken 1/3/2025 2200)  Care Plan - Patient's Chronic Conditions and Co-Morbidity Symptoms are Monitored and Maintained or Improved: Monitor and assess patient's chronic conditions and comorbid symptoms for stability, deterioration, or improvement     Problem: Discharge Planning  Goal: Discharge to home or other facility with appropriate resources  Outcome: Progressing  Flowsheets  Taken 1/3/2025 2219  Discharge to home or other facility with appropriate resources: Identify barriers to discharge with patient and caregiver  Taken 1/3/2025 2200  Discharge to home or other facility with appropriate resources: Identify barriers to discharge with patient and caregiver     Problem: Safety - Adult  Goal: Free from fall injury  Outcome: Progressing     Problem: ABCDS Injury Assessment  Goal: Absence of physical injury  Outcome: Progressing     Problem: Pain  Goal: Verbalizes/displays adequate comfort level or baseline comfort level  Outcome: Progressing

## 2025-01-04 NOTE — PROGRESS NOTES
[facility]  [address] [place], [place] [address] [phone]    Formerly [facility] Adult  Hospitalist Group                                                                                          Hospitalist Progress Note  Jazmyn Cid MD        Date of Service:  2025  NAME:  Amanda Barkley  :  1950  MRN:  [id number]      Admission Summary:   74-year-old female is admitted with slurred speech and difficulty swallowing and left facial droop    Interval history / Subjective:   Still with left facial droop and watery eyes     Assessment & Plan:     Dysarthria/left facial droop  -CT and CTA head and neck without any concerns  -Brain MRI pending  -, A1c 7.8  -Continue aspirin and statin  -Neurology evaluated    Type 2 diabetes  -A1c 7.8  -Continue SSI per protocol    Chronic COPD  Chronic respiratory failure  -Continue home meds  -On 2 L nasal cannula at baseline    CAD  -Continue aspirin    Outisde Records, prior notes, labs, radiology, and medications reviewed     Code status: Full code  DVT prophylaxis: [facility] Problems             Last Modified POA    * (Principal) Dysarthria 1/3/2025 Yes    DM (diabetes mellitus) ([facility]) 1/3/2025 Yes    HTN (hypertension) 1/3/2025 Yes    COPD (chronic obstructive pulmonary disease) ([facility]) 1/3/2025 Yes    Hypercholesterolemia 1/3/2025 Yes    CAD (coronary artery disease) 1/3/2025 Yes          Review of Systems:   Pertinent items are noted in HPI.       Vital Signs:    Last 24hrs VS reviewed since prior progress note. Most recent are:  Vitals:    25 1520   BP: (!) 149/60   Pulse: 84   Resp: 18   Temp: 98.1 °F (36.7 °C)   SpO2: 95%         Intake/Output Summary (Last 24 hours) at 2025 1642  Last data filed at 2025 1316  Gross per 24 hour   Intake 600 ml   Output 1050 ml   Net -450 ml        Physical Examination:             Constitutional:  No acute distress, cooperative, pleasant

## 2025-01-05 ENCOUNTER — TELEPHONE (OUTPATIENT)
Age: 75
End: 2025-01-05

## 2025-01-05 ENCOUNTER — APPOINTMENT (OUTPATIENT)
Facility: HOSPITAL | Age: 75
End: 2025-01-05
Payer: MEDICARE

## 2025-01-05 LAB
AMMONIA PLAS-SCNC: 13 UMOL/L
GLUCOSE BLD STRIP.AUTO-MCNC: 174 MG/DL (ref 65–117)
GLUCOSE BLD STRIP.AUTO-MCNC: 190 MG/DL (ref 65–117)
GLUCOSE BLD STRIP.AUTO-MCNC: 199 MG/DL (ref 65–117)
GLUCOSE BLD STRIP.AUTO-MCNC: 342 MG/DL (ref 65–117)
GLUCOSE BLD STRIP.AUTO-MCNC: 345 MG/DL (ref 65–117)
SERVICE CMNT-IMP: ABNORMAL

## 2025-01-05 PROCEDURE — 6370000000 HC RX 637 (ALT 250 FOR IP): Performed by: INTERNAL MEDICINE

## 2025-01-05 PROCEDURE — 2060000000 HC ICU INTERMEDIATE R&B

## 2025-01-05 PROCEDURE — 6370000000 HC RX 637 (ALT 250 FOR IP): Performed by: NURSE PRACTITIONER

## 2025-01-05 PROCEDURE — 6370000000 HC RX 637 (ALT 250 FOR IP): Performed by: FAMILY MEDICINE

## 2025-01-05 PROCEDURE — 99232 SBSQ HOSP IP/OBS MODERATE 35: CPT | Performed by: PSYCHIATRY & NEUROLOGY

## 2025-01-05 PROCEDURE — 2500000003 HC RX 250 WO HCPCS: Performed by: INTERNAL MEDICINE

## 2025-01-05 PROCEDURE — 82140 ASSAY OF AMMONIA: CPT

## 2025-01-05 PROCEDURE — 94761 N-INVAS EAR/PLS OXIMETRY MLT: CPT

## 2025-01-05 PROCEDURE — 6370000000 HC RX 637 (ALT 250 FOR IP)

## 2025-01-05 PROCEDURE — 6370000000 HC RX 637 (ALT 250 FOR IP): Performed by: PSYCHIATRY & NEUROLOGY

## 2025-01-05 PROCEDURE — 97161 PT EVAL LOW COMPLEX 20 MIN: CPT

## 2025-01-05 PROCEDURE — 82962 GLUCOSE BLOOD TEST: CPT

## 2025-01-05 PROCEDURE — 97530 THERAPEUTIC ACTIVITIES: CPT

## 2025-01-05 PROCEDURE — 6360000002 HC RX W HCPCS: Performed by: INTERNAL MEDICINE

## 2025-01-05 PROCEDURE — 97165 OT EVAL LOW COMPLEX 30 MIN: CPT

## 2025-01-05 PROCEDURE — 70551 MRI BRAIN STEM W/O DYE: CPT

## 2025-01-05 RX ORDER — PREDNISONE 20 MG/1
20 TABLET ORAL EVERY MORNING
Status: DISCONTINUED | OUTPATIENT
Start: 2025-01-14 | End: 2025-01-10 | Stop reason: HOSPADM

## 2025-01-05 RX ORDER — GABAPENTIN 100 MG/1
100 CAPSULE ORAL ONCE
Status: COMPLETED | OUTPATIENT
Start: 2025-01-05 | End: 2025-01-05

## 2025-01-05 RX ORDER — PREDNISONE 20 MG/1
60 TABLET ORAL EVERY MORNING
Status: COMPLETED | OUTPATIENT
Start: 2025-01-05 | End: 2025-01-09

## 2025-01-05 RX ORDER — LORAZEPAM 1 MG/1
1 TABLET ORAL ONCE
Status: COMPLETED | OUTPATIENT
Start: 2025-01-05 | End: 2025-01-05

## 2025-01-05 RX ORDER — PREDNISONE 20 MG/1
40 TABLET ORAL EVERY MORNING
Status: DISCONTINUED | OUTPATIENT
Start: 2025-01-12 | End: 2025-01-10 | Stop reason: HOSPADM

## 2025-01-05 RX ORDER — TIZANIDINE 2 MG/1
2 TABLET ORAL 2 TIMES DAILY
COMMUNITY

## 2025-01-05 RX ORDER — PREGABALIN 150 MG/1
150 CAPSULE ORAL 2 TIMES DAILY
Status: ON HOLD | COMMUNITY
End: 2025-01-10 | Stop reason: HOSPADM

## 2025-01-05 RX ORDER — VALACYCLOVIR HYDROCHLORIDE 500 MG/1
1000 TABLET, FILM COATED ORAL 3 TIMES DAILY
Status: DISCONTINUED | OUTPATIENT
Start: 2025-01-05 | End: 2025-01-10 | Stop reason: HOSPADM

## 2025-01-05 RX ADMIN — PREDNISONE 60 MG: 20 TABLET ORAL at 13:12

## 2025-01-05 RX ADMIN — HYDRALAZINE HYDROCHLORIDE 10 MG: 20 INJECTION INTRAMUSCULAR; INTRAVENOUS at 21:08

## 2025-01-05 RX ADMIN — SODIUM CHLORIDE, PRESERVATIVE FREE 10 ML: 5 INJECTION INTRAVENOUS at 09:10

## 2025-01-05 RX ADMIN — LORAZEPAM 1 MG: 1 TABLET ORAL at 09:54

## 2025-01-05 RX ADMIN — ASPIRIN 81 MG: 81 TABLET, CHEWABLE ORAL at 09:10

## 2025-01-05 RX ADMIN — INSULIN LISPRO 1 UNITS: 100 INJECTION, SOLUTION INTRAVENOUS; SUBCUTANEOUS at 16:40

## 2025-01-05 RX ADMIN — GABAPENTIN 100 MG: 100 CAPSULE ORAL at 21:43

## 2025-01-05 RX ADMIN — ACETAMINOPHEN 650 MG: 325 TABLET ORAL at 21:08

## 2025-01-05 RX ADMIN — INSULIN LISPRO 1 UNITS: 100 INJECTION, SOLUTION INTRAVENOUS; SUBCUTANEOUS at 09:09

## 2025-01-05 RX ADMIN — SODIUM CHLORIDE, PRESERVATIVE FREE 10 ML: 5 INJECTION INTRAVENOUS at 21:07

## 2025-01-05 RX ADMIN — VALACYCLOVIR HYDROCHLORIDE 1000 MG: 500 TABLET, FILM COATED ORAL at 21:08

## 2025-01-05 RX ADMIN — ATORVASTATIN CALCIUM 80 MG: 20 TABLET, FILM COATED ORAL at 21:08

## 2025-01-05 RX ADMIN — INSULIN LISPRO 1 UNITS: 100 INJECTION, SOLUTION INTRAVENOUS; SUBCUTANEOUS at 13:12

## 2025-01-05 RX ADMIN — VALACYCLOVIR HYDROCHLORIDE 1000 MG: 500 TABLET, FILM COATED ORAL at 13:12

## 2025-01-05 RX ADMIN — INSULIN LISPRO 3 UNITS: 100 INJECTION, SOLUTION INTRAVENOUS; SUBCUTANEOUS at 21:08

## 2025-01-05 ASSESSMENT — PAIN DESCRIPTION - ORIENTATION
ORIENTATION: RIGHT;LEFT
ORIENTATION: RIGHT;LEFT

## 2025-01-05 ASSESSMENT — PAIN DESCRIPTION - LOCATION
LOCATION: FOOT
LOCATION: FOOT

## 2025-01-05 ASSESSMENT — PAIN SCALES - GENERAL
PAINLEVEL_OUTOF10: 10
PAINLEVEL_OUTOF10: 5
PAINLEVEL_OUTOF10: 6

## 2025-01-05 ASSESSMENT — PAIN DESCRIPTION - PAIN TYPE: TYPE: NEUROPATHIC PAIN

## 2025-01-05 NOTE — PLAN OF CARE
Problem: Chronic Conditions and Co-morbidities  Goal: Patient's chronic conditions and co-morbidity symptoms are monitored and maintained or improved  Outcome: Progressing  Flowsheets (Taken 1/5/2025 0735)  Care Plan - Patient's Chronic Conditions and Co-Morbidity Symptoms are Monitored and Maintained or Improved: Monitor and assess patient's chronic conditions and comorbid symptoms for stability, deterioration, or improvement     Problem: Discharge Planning  Goal: Discharge to home or other facility with appropriate resources  Outcome: Progressing  Flowsheets (Taken 1/5/2025 0735)  Discharge to home or other facility with appropriate resources: Identify barriers to discharge with patient and caregiver     Problem: Safety - Adult  Goal: Free from fall injury  Outcome: Progressing     Problem: ABCDS Injury Assessment  Goal: Absence of physical injury  Outcome: Progressing     Problem: Pain  Goal: Verbalizes/displays adequate comfort level or baseline comfort level  1/5/2025 1111 by Scotty Melara, RN  Outcome: Progressing  1/4/2025 2152 by Jaz Victor RN  Outcome: Progressing     Problem: Skin/Tissue Integrity  Goal: Absence of new skin breakdown  Description: 1.  Monitor for areas of redness and/or skin breakdown  2.  Assess vascular access sites hourly  3.  Every 4-6 hours minimum:  Change oxygen saturation probe site  4.  Every 4-6 hours:  If on nasal continuous positive airway pressure, respiratory therapy assess nares and determine need for appliance change or resting period.  Outcome: Progressing

## 2025-01-05 NOTE — PLAN OF CARE
Problem: Chronic Conditions and Co-morbidities  Goal: Patient's chronic conditions and co-morbidity symptoms are monitored and maintained or improved  1/5/2025 1400 by Scotty Melara RN  Outcome: Progressing  1/5/2025 1111 by Scotty Melara RN  Outcome: Progressing  Flowsheets (Taken 1/5/2025 0735)  Care Plan - Patient's Chronic Conditions and Co-Morbidity Symptoms are Monitored and Maintained or Improved: Monitor and assess patient's chronic conditions and comorbid symptoms for stability, deterioration, or improvement     Problem: Discharge Planning  Goal: Discharge to home or other facility with appropriate resources  1/5/2025 1400 by Scotty Melara RN  Outcome: Progressing  1/5/2025 1111 by Scotty Melara RN  Outcome: Progressing  Flowsheets (Taken 1/5/2025 0735)  Discharge to home or other facility with appropriate resources: Identify barriers to discharge with patient and caregiver     Problem: Safety - Adult  Goal: Free from fall injury  1/5/2025 1400 by Scotty Melara RN  Outcome: Progressing  1/5/2025 1111 by Scotty Melara RN  Outcome: Progressing     Problem: ABCDS Injury Assessment  Goal: Absence of physical injury  1/5/2025 1400 by Scotty Melara RN  Outcome: Progressing  1/5/2025 1111 by Scotty Melara RN  Outcome: Progressing     Problem: Pain  Goal: Verbalizes/displays adequate comfort level or baseline comfort level  1/5/2025 1400 by Scotty Melara RN  Outcome: Progressing  1/5/2025 1111 by Scotty Melara RN  Outcome: Progressing     Problem: Skin/Tissue Integrity  Goal: Absence of new skin breakdown  Description: 1.  Monitor for areas of redness and/or skin breakdown  2.  Assess vascular access sites hourly  3.  Every 4-6 hours minimum:  Change oxygen saturation probe site  4.  Every 4-6 hours:  If on nasal continuous positive airway pressure, respiratory therapy assess nares and determine need for appliance change or resting period.  1/5/2025 1400 by Scotty Melara RN  Outcome:

## 2025-01-05 NOTE — PLAN OF CARE
Problem: Physical Therapy - Adult  Goal: By Discharge: Performs mobility at highest level of function for planned discharge setting.  See evaluation for individualized goals.  Description: FUNCTIONAL STATUS PRIOR TO ADMISSION: Patient was modified independent using a rollator and single point cane for functional mobility. Questionable historian.    HOME SUPPORT PRIOR TO ADMISSION: The patient lived with multiple family members that assist as needed.    Physical Therapy Goals  Initiated 1/5/2025  1.  Patient will move from supine to sit and sit to supine, scoot up and down, and roll side to side in bed with modified independence within 7 day(s).    2.  Patient will perform sit to stand with minimal assistance within 7 day(s).  3.  Patient will transfer from bed to chair and chair to bed with minimal assistance using the least restrictive device within 7 day(s).  4.  Patient will ambulate with minimal assistance for 10 feet with the least restrictive device within 7 day(s).   5.  Will assess stair goal when more appropriate.    Outcome: Progressing   PHYSICAL THERAPY EVALUATION    Patient: Amanda Barkley (74 y.o. female)  Date: 1/5/2025  Primary Diagnosis: Dysarthria [R47.1]  Limb tremor [R25.1]  Facial droop [R29.810]       Precautions: Restrictions/Precautions: Fall Risk                      ASSESSMENT :   DEFICITS/IMPAIRMENTS:   The patient is limited by decreased functional mobility, independence in ADLs, high-level IADLs, ROM, strength, body mechanics, activity tolerance, endurance, attention/concentration, coordination, balance, fine-motor control, and high fall risk following admission for L facial droop and dysarthria. MRI negative for acute process. Pt with prior CVA with residual R sided weakness. Pt requires increased time for all mobility. CGA to EOB though displays multiple posterior and L lateral LOB. Even with R hand support on bed rail pt with continual LOB to the L with delayed righting

## 2025-01-05 NOTE — PROGRESS NOTES
0700: Bedside and Verbal shift change report given to Scotty RN (oncoming nurse) by Jaz RN (offgoing nurse). Report included the following information Nurse Handoff Report, Adult Overview, Intake/Output, MAR, and Recent Results.     1900: Bedside and Verbal shift change report given to Martín RN (oncoming nurse) by Scotty RN (offgoing nurse). Report included the following information Nurse Handoff Report, Adult Overview, Intake/Output, MAR, and Recent Results.

## 2025-01-05 NOTE — PROGRESS NOTES
INPATIENT NEUROLOGY FOLLOW-UP NOTE    NAME:  Amanda Barkley  MRN:  [id number]  : 1950      ADMIT DATE:   1/3/2025 12:35 PM    REASON FOR FOLLOW UP: Left facial droop, left eyelid droop, subtle left forehead droop    Impression/ Plan from Neurology Consult/ 25 : 1) new onset left eyelid droop, left lower facial droop, left sided head pain radiating to left throat, inability to keep mouth pursed; variable leftward gaze presence (when not being examined, eyes noted to move across midline to right when tracking examiner): brainstem stroke vs early symptoms of Bell's Palsy.  Brain MRI pending.  2) Tremors: existing issue (pt indicates outpatient neurologist suggested it may be an atypical form of parkinson's). Check Ammonia level due to asterixis like tremor.  3) will follow up tomorrow    ===========    25  Pt resting in bed, recently back from MRI.  Continues to report gritty sensation of left eye and says this AM she continued to have difficulty drinking due to leak from left corner of mouth.  On Exam: awake, alert, mild left eyelid droop, mild left lower facial weakness, subtle reduced eyebrow raise on LEFT compared to right (more furrows).  Intact LT both sides face.      ASSESSMENT/ PLAN:      Discussed with patient that Brain MRI doesn't show any intracranial abnormality to account for symptoms, and that her symptoms are more likely due to Bell's Palsy affecting the left facial nerve (explained this to her in general terms)    Entered Order for 10 day taper of Prednisone (60 mg day x 5 days, 50 mg day x 2 days, 40 mg day x 2 days, 20 mg day x 1 day) and 7 day course of Valacyclovir (1000 mg three times a day x 7 days).     Continue ASA 81 mg/ day and Lipitor 80 mg/ day (LDL during this admission 156) due to prior hx of strokes    No additional inpatient workup is planned.  Will s/o.  Pt can be d/c home today from my standpoint    Sent message to Neurology Clinic to schedule follow up visit in 8

## 2025-01-05 NOTE — PROGRESS NOTES
Occupational/Physical Therapy    Completed chart review and attempted to see patient for therapy evals however transport present to take patient to MRI.  Will continue to follow.    Thank you,    Mayda Mc, OT

## 2025-01-05 NOTE — PLAN OF CARE
Status:  Orientation  Overall Orientation Status: Within Normal Limits  Orientation Level: Oriented X4  Cognition  Overall Cognitive Status: Exceptions  Arousal/Alertness: Appears intact  Following Commands: Follows one step commands with increased time;Follows one step commands with repetition  Attention Span: Appears intact  Memory: Impaired;Decreased short term memory;Decreased recall of recent events  Safety Judgement: Impaired  Problem Solving: Impaired  Insights: Not aware of deficits  Initiation: Requires cues for some  Sequencing: Requires cues for some    Vision/Perceptual:    Vision - Basic Assessment  Prior Vision: Wears glasses all the time  Visual Field Cut: Not tested  Oculo Motor Control: Impaired  Vision Comments: eyes closed through out session, able to open briefly on commands.  Lateral tracking WDL, unable to read distance without glasses, saccades with mild decrease in speed but able to complete        Range of Motion:   AROM: Generally decreased, functional         Strength:  Strength: Generally decreased, functional (symmetriccal --limited MMT secondary to tremulous movement in all limbs LE>UE)      Coordination:  Coordination: Grossly decreased, non-functional            Tone & Sensation:   Tone: Abnormal       Functional Mobility and Transfers for ADLs:    Bed Mobility:     Bed Mobility Training  Bed Mobility Training: Yes  Supine to Sit: Minimum assistance  Sit to Supine: Minimum assistance  Scooting: Minimum assistance    Transfers:      Transfer Training  Transfer Training: Yes  Sit to Stand: Moderate assistance;Assist X2  Stand to Sit: Moderate assistance;Assist X2                     Balance:      Balance  Sitting: Impaired  Sitting - Static: Poor (constant support) (L lateral lean, poor righting reactions)  Sitting - Dynamic: Poor (constant support)  Standing: Impaired  Standing - Static: Constant support;Poor  Standing - Dynamic: Constant support;Poor      ADL Assessment:       Functional Assessment Scores Predict Acute Care Hospital Discharge Destination, Physical Therapy, Volume 94, Issue 9, 1 September 2014, Pages 9367-3479, https://doi.org/[email or web address]/[email or web address]    Pain Rating:  L eye irritated        Activity Tolerance:   Fair  and Poor    After treatment:   Patient left in no apparent distress in bed, Call bell within reach, Bed/ chair alarm activated, and Side rails x3    COMMUNICATION/EDUCATION:   The patient's plan of care was discussed with: physical therapist, registered nurse, and physician    Patient Education  Education Given To: Patient  Education Provided: Role of Therapy;Plan of Care;Fall Prevention Strategies;Mobility Training  Education Method: Demonstration;Verbal  Barriers to Learning: Cognition  Education Outcome: Continued education needed    Thank you for this referral.  Mayda Mc OT  Minutes: 35    Occupational Therapy Evaluation Charge Determination   History Examination Decision-Making   LOW Complexity : Brief history review  LOW Complexity: 1-3 Performance deficits relating to physical, cognitive, or psychosocial skills that result in activity limitations and/or participation restrictions LOW Complexity: No comorbidities that affect functional and  no verbal  or physical assist needed to complete eval tasks   Based on the above components, the patient evaluation is determined to be of the following complexity level: Low

## 2025-01-05 NOTE — PROGRESS NOTES
mg  650 mg Oral Q4H PRN    sodium chloride flush 0.9 % injection 5-40 mL  5-40 mL IntraVENous 2 times per day    sodium chloride flush 0.9 % injection 5-40 mL  5-40 mL IntraVENous PRN    0.9 % sodium chloride infusion   IntraVENous PRN    ondansetron (ZOFRAN-ODT) disintegrating tablet 4 mg  4 mg Oral Q8H PRN    Or    ondansetron (ZOFRAN) injection 4 mg  4 mg IntraVENous Q6H PRN    polyethylene glycol (GLYCOLAX) packet 17 g  17 g Oral Daily PRN    [Held by provider] enoxaparin (LOVENOX) injection 40 mg  40 mg SubCUTAneous Daily    atorvastatin (LIPITOR) tablet 80 mg  80 mg Oral Nightly    insulin lispro (HUMALOG,ADMELOG) injection vial 0-4 Units  0-4 Units SubCUTAneous 4x Daily AC & HS    hydrALAZINE (APRESOLINE) injection 10 mg  10 mg IntraVENous Q6H PRN    aspirin chewable tablet 81 mg  81 mg Oral Daily     ______________________________________________________________________  EXPECTED LENGTH OF STAY:    ACTUAL LENGTH OF STAY:          2                 Jazmyn Cid MD

## 2025-01-05 NOTE — PLAN OF CARE
Problem: Pain  Goal: Verbalizes/displays adequate comfort level or baseline comfort level  1/4/2025 2152 by Jaz Victor, RN  Outcome: Progressing  1/4/2025 0941 by Paula Dodson, RN  Outcome: Progressing

## 2025-01-06 ENCOUNTER — APPOINTMENT (OUTPATIENT)
Facility: HOSPITAL | Age: 75
End: 2025-01-06
Payer: MEDICARE

## 2025-01-06 LAB
GLUCOSE BLD STRIP.AUTO-MCNC: 226 MG/DL (ref 65–117)
GLUCOSE BLD STRIP.AUTO-MCNC: 265 MG/DL (ref 65–117)
GLUCOSE BLD STRIP.AUTO-MCNC: 291 MG/DL (ref 65–117)
GLUCOSE BLD STRIP.AUTO-MCNC: 365 MG/DL (ref 65–117)
SERVICE CMNT-IMP: ABNORMAL

## 2025-01-06 PROCEDURE — 6370000000 HC RX 637 (ALT 250 FOR IP): Performed by: FAMILY MEDICINE

## 2025-01-06 PROCEDURE — 2500000003 HC RX 250 WO HCPCS: Performed by: INTERNAL MEDICINE

## 2025-01-06 PROCEDURE — 82962 GLUCOSE BLOOD TEST: CPT

## 2025-01-06 PROCEDURE — 6370000000 HC RX 637 (ALT 250 FOR IP): Performed by: INTERNAL MEDICINE

## 2025-01-06 PROCEDURE — 92611 MOTION FLUOROSCOPY/SWALLOW: CPT

## 2025-01-06 PROCEDURE — 6370000000 HC RX 637 (ALT 250 FOR IP): Performed by: PSYCHIATRY & NEUROLOGY

## 2025-01-06 PROCEDURE — 97530 THERAPEUTIC ACTIVITIES: CPT

## 2025-01-06 PROCEDURE — 6370000000 HC RX 637 (ALT 250 FOR IP): Performed by: NURSE PRACTITIONER

## 2025-01-06 PROCEDURE — 2700000000 HC OXYGEN THERAPY PER DAY

## 2025-01-06 PROCEDURE — 2060000000 HC ICU INTERMEDIATE R&B

## 2025-01-06 PROCEDURE — 74230 X-RAY XM SWLNG FUNCJ C+: CPT

## 2025-01-06 PROCEDURE — 94761 N-INVAS EAR/PLS OXIMETRY MLT: CPT

## 2025-01-06 PROCEDURE — 97535 SELF CARE MNGMENT TRAINING: CPT

## 2025-01-06 RX ORDER — ROSUVASTATIN CALCIUM 20 MG/1
20 TABLET, COATED ORAL DAILY
Status: ON HOLD | COMMUNITY
End: 2025-01-10 | Stop reason: HOSPADM

## 2025-01-06 RX ORDER — PROMETHAZINE HYDROCHLORIDE 25 MG/1
25 SUPPOSITORY RECTAL EVERY 6 HOURS PRN
COMMUNITY

## 2025-01-06 RX ORDER — MONTELUKAST SODIUM 10 MG/1
10 TABLET ORAL DAILY
COMMUNITY

## 2025-01-06 RX ORDER — TIZANIDINE 2 MG/1
2 TABLET ORAL NIGHTLY PRN
Status: DISCONTINUED | OUTPATIENT
Start: 2025-01-06 | End: 2025-01-09

## 2025-01-06 RX ORDER — FLUTICASONE PROPIONATE 50 MCG
2 SPRAY, SUSPENSION (ML) NASAL DAILY
COMMUNITY

## 2025-01-06 RX ORDER — NITROGLYCERIN 0.4 MG/1
0.4 TABLET SUBLINGUAL EVERY 5 MIN PRN
COMMUNITY

## 2025-01-06 RX ORDER — PANTOPRAZOLE SODIUM 40 MG/1
40 TABLET, DELAYED RELEASE ORAL DAILY
COMMUNITY

## 2025-01-06 RX ORDER — GABAPENTIN 100 MG/1
100 CAPSULE ORAL 3 TIMES DAILY
Status: DISCONTINUED | OUTPATIENT
Start: 2025-01-06 | End: 2025-01-10 | Stop reason: HOSPADM

## 2025-01-06 RX ORDER — IPRATROPIUM BROMIDE AND ALBUTEROL SULFATE 2.5; .5 MG/3ML; MG/3ML
1 SOLUTION RESPIRATORY (INHALATION) EVERY 4 HOURS
COMMUNITY

## 2025-01-06 RX ORDER — INSULIN GLARGINE 100 [IU]/ML
25 INJECTION, SOLUTION SUBCUTANEOUS NIGHTLY
Status: ON HOLD | COMMUNITY
End: 2025-01-10

## 2025-01-06 RX ORDER — INSULIN ASPART 100 [IU]/ML
4 INJECTION, SOLUTION INTRAVENOUS; SUBCUTANEOUS
Status: ON HOLD | COMMUNITY
End: 2025-01-10

## 2025-01-06 RX ORDER — LATANOPROST 50 UG/ML
1 SOLUTION/ DROPS OPHTHALMIC NIGHTLY
COMMUNITY

## 2025-01-06 RX ADMIN — VALACYCLOVIR HYDROCHLORIDE 1000 MG: 500 TABLET, FILM COATED ORAL at 09:50

## 2025-01-06 RX ADMIN — ATORVASTATIN CALCIUM 80 MG: 20 TABLET, FILM COATED ORAL at 22:12

## 2025-01-06 RX ADMIN — PREDNISONE 60 MG: 20 TABLET ORAL at 09:49

## 2025-01-06 RX ADMIN — INSULIN LISPRO 10 UNITS: 100 INJECTION, SOLUTION INTRAVENOUS; SUBCUTANEOUS at 16:58

## 2025-01-06 RX ADMIN — SODIUM CHLORIDE, PRESERVATIVE FREE 10 ML: 5 INJECTION INTRAVENOUS at 09:50

## 2025-01-06 RX ADMIN — VALACYCLOVIR HYDROCHLORIDE 1000 MG: 500 TABLET, FILM COATED ORAL at 22:12

## 2025-01-06 RX ADMIN — GABAPENTIN 100 MG: 100 CAPSULE ORAL at 22:12

## 2025-01-06 RX ADMIN — INSULIN LISPRO 1 UNITS: 100 INJECTION, SOLUTION INTRAVENOUS; SUBCUTANEOUS at 09:49

## 2025-01-06 RX ADMIN — INSULIN LISPRO 2 UNITS: 100 INJECTION, SOLUTION INTRAVENOUS; SUBCUTANEOUS at 22:12

## 2025-01-06 RX ADMIN — GABAPENTIN 100 MG: 100 CAPSULE ORAL at 15:12

## 2025-01-06 RX ADMIN — VALACYCLOVIR HYDROCHLORIDE 1000 MG: 500 TABLET, FILM COATED ORAL at 15:12

## 2025-01-06 RX ADMIN — ASPIRIN 81 MG: 81 TABLET, CHEWABLE ORAL at 09:50

## 2025-01-06 RX ADMIN — TIZANIDINE 2 MG: 2 TABLET ORAL at 23:41

## 2025-01-06 RX ADMIN — SODIUM CHLORIDE, PRESERVATIVE FREE 10 ML: 5 INJECTION INTRAVENOUS at 22:16

## 2025-01-06 RX ADMIN — INSULIN LISPRO 2 UNITS: 100 INJECTION, SOLUTION INTRAVENOUS; SUBCUTANEOUS at 15:13

## 2025-01-06 ASSESSMENT — PAIN DESCRIPTION - DESCRIPTORS
DESCRIPTORS: BURNING;THROBBING
DESCRIPTORS: BURNING;THROBBING

## 2025-01-06 ASSESSMENT — PAIN DESCRIPTION - ORIENTATION
ORIENTATION: LOWER
ORIENTATION: RIGHT;LEFT

## 2025-01-06 ASSESSMENT — PAIN DESCRIPTION - LOCATION
LOCATION: LEG
LOCATION: FOOT

## 2025-01-06 ASSESSMENT — PAIN SCALES - GENERAL
PAINLEVEL_OUTOF10: 9

## 2025-01-06 NOTE — PROGRESS NOTES
[facility]  [address] [address], [place] [address] [phone]    [facility] Adult  Hospitalist Group                                                                                          Hospitalist Progress Note  Jazmyn Cid MD        Date of Service:  2025  NAME:  Amanda Barkley  :  1950  MRN:  [id number]      Admission Summary:   74-year-old female is admitted with slurred speech and difficulty swallowing and left facial droop    Interval history / Subjective:   Facial droop improved this morning.  Still reports watery eyes and left-sided facial pain     Assessment & Plan:     Dysarthria/left facial droop  Bell's palsy  -CT and CTA head and neck without any concerns  -Brain MRI without acute stroke  -, A1c 7.8  -Continue aspirin and statin due to prior history of strokes  -Neurology evaluated, started prednisone and valacyclovir  -Started scheduled gabapentin for pain    Type 2 diabetes  -A1c 7.8  -Continue SSI per protocol    Chronic COPD  Chronic respiratory failure  -Continue home meds  -On 2 L nasal cannula at baseline    CAD  -Continue aspirin    Generalized weakness  -PT/OT are recommending IPR once stable    Outisde Records, prior notes, labs, radiology, and medications reviewed     Code status: Full code  DVT prophylaxis: [facility] Problems             Last Modified POA    * (Principal) Dysarthria 1/3/2025 Yes    DM (diabetes mellitus) ([facility]) 1/3/2025 Yes    HTN (hypertension) 1/3/2025 Yes    COPD (chronic obstructive pulmonary disease) ([facility]) 1/3/2025 Yes    Hypercholesterolemia 1/3/2025 Yes    CAD (coronary artery disease) 1/3/2025 Yes          Review of Systems:   Pertinent items are noted in HPI.       Vital Signs:    Last 24hrs VS reviewed since prior progress note. Most recent are:  Vitals:    25 1140   BP: (!) 149/69   Pulse: 82   Resp: 16   Temp: 97.9 °F (36.6 °C)   SpO2: 98%         Intake/Output

## 2025-01-06 NOTE — PLAN OF CARE
Problem: Occupational Therapy - Adult  Goal: By Discharge: Performs self-care activities at highest level of function for planned discharge setting.  See evaluation for individualized goals.  Description: FUNCTIONAL STATUS PRIOR TO ADMISSION:  Patient is questionable historian.  She lives with grandson and DIL.  She reports mod indep with mobility at rollator level and then can when rollator will not fit in parts of apartment.  She reports having someone to assist her with ADLs but was questionable regarding recent need vs long term needs.    , Prior Level of Assist for ADLs: Needs assistance,  ,  ,  ,  ,  , Prior Level of Assist for Homemaking: Needs assistance,  ,  , Active : No     HOME SUPPORT: Patient lived family.  She reports her son wants her to move in with him.     Occupational Therapy Goals:  Initiated 1/5/2025  1.  Patient will perform grooming in unsupported sitting with Supervision within 7 day(s).  2.  Patient will perform upper body dressing with Supervision within 7 day(s).  3.  Patient will perform lower body dressing with Moderate Assist within 7 day(s).  4.  Patient will perform toilet transfers to [facility] with Moderate Assist  within 7 day(s).  5.  Patient will perform all aspects of toileting with Moderate Assist within 7 day(s).  6.  Patient will participate in upper extremity therapeutic exercise/activities with Supervision for 5 minutes within 7 day(s).    7.  Patient will participate in Fugl Vernon assessment within 7 day(s).  Outcome: Progressing     OCCUPATIONAL THERAPY TREATMENT  Patient: Amanda Barkley (74 y.o. female)  Date: 1/6/2025  Primary Diagnosis: Dysarthria [R47.1]  Limb tremor [R25.1]  Facial droop [R29.810]       Precautions: Fall Risk                Chart, occupational therapy assessment, plan of care, and goals were reviewed.    ASSESSMENT  Patient continues to benefit from skilled OT services and is slowly progressing towards goals. Patient initially presenting with

## 2025-01-06 NOTE — PROGRESS NOTES
Occupational and Physical Therapy:  01/06/25    Chart reviewed in prep for therapy tx. Pt currently LILIAN for MBSS and unavailable. Will follow.     Thank you,  Annette Zuñiga, OTR/L

## 2025-01-06 NOTE — PLAN OF CARE
Problem: Chronic Conditions and Co-morbidities  Goal: Patient's chronic conditions and co-morbidity symptoms are monitored and maintained or improved  Outcome: Progressing     Problem: Discharge Planning  Goal: Discharge to home or other facility with appropriate resources  Outcome: Progressing     Problem: Safety - Adult  Goal: Free from fall injury  Outcome: Progressing     Problem: ABCDS Injury Assessment  Goal: Absence of physical injury  Outcome: Progressing     Problem: Pain  Goal: Verbalizes/displays adequate comfort level or baseline comfort level  Outcome: Progressing     Problem: SLP Adult - Impaired Swallowing  Goal: By Discharge: Advance to least restrictive diet without signs or symptoms of aspiration for planned discharge setting.  See evaluation for individualized goals.  Description: Swallowing goals initiated 1-4-25:  1) tolerate reg diet, mildly thick liquids without s/s aspiration by 1-7-25 1/6/2025 1234 by Lisa Peacock, SLP  Outcome: Progressing     Problem: Skin/Tissue Integrity  Goal: Absence of new skin breakdown  Description: 1.  Monitor for areas of redness and/or skin breakdown  2.  Assess vascular access sites hourly  3.  Every 4-6 hours minimum:  Change oxygen saturation probe site  4.  Every 4-6 hours:  If on nasal continuous positive airway pressure, respiratory therapy assess nares and determine need for appliance change or resting period.  Outcome: Progressing     Problem: Occupational Therapy - Adult  Goal: By Discharge: Performs self-care activities at highest level of function for planned discharge setting.  See evaluation for individualized goals.  Description: FUNCTIONAL STATUS PRIOR TO ADMISSION:  Patient is questionable historian.  She lives with grandson and DIL.  She reports mod indep with mobility at rollator level and then can when rollator will not fit in parts of apartment.  She reports having someone to assist her with ADLs but was questionable regarding recent  feet with the least restrictive device within 7 day(s).   5.  Will assess stair goal when more appropriate.    1/6/2025 1529 by Holli Trejo, PT  Outcome: Progressing

## 2025-01-06 NOTE — PLAN OF CARE
SPEECH PATHOLOGY MODIFIED BARIUM SWALLOW STUDY  Patient: Amanda Barkley (74 y.o. female)  Date: 1/6/2025  Primary Diagnosis: Dysarthria [R47.1]  Limb tremor [R25.1]  Facial droop [R29.810]       Precautions: aspiration  Fall Risk                  ASSESSMENT :  Patient with mild oral-pharyngeal dysphagia, mostly related to pills. She was unable to propel pill to pharynx in this study, despite use of multiple drinks and pudding. RN reports no pill dysphagia this morning. She had transient penetration with thins intermittently and 1 possible aspiration event in tiny amount when trying to take pill with thins.   Lots of gagging during MBS was not related to aspiration.   Can discontinue thick liquids at this time.     Patient will benefit from skilled intervention to address the above impairments.     PLAN :  Recommendations and Planned Interventions:  Diet: Regular and thin liquids  Upright for all PO  May need to crush meds if having pill dysphagia      Acute SLP Services: Yes, patient will be followed by speech-language pathology 2x/week to address goals. Patient's rehabilitation potential is considered to be Good.  Discharge Recommendations: Continue to assess pending progress     SUBJECTIVE:   Patient stated “dr Black told me I regurgitate”.-not noted in this study.   For pills \"I have to use a lot of saliva\"    OBJECTIVE:     Past Medical History:   Diagnosis Date    CAD (coronary artery disease) 6/18/2010    Chronic pain     lower back    COPD (chronic obstructive pulmonary disease) (HCC) 8/27/2010    Diabetes (HCC)     type II    Diabetic gastroparesis (HCC) 7/7/2010    Headache(784.0) 10/7/2010    HTN (hypertension) 6/18/2010    Other ill-defined conditions(799.89)     mixed hyperlipidemia     Past Surgical History:   Procedure Laterality Date    CHOLECYSTECTOMY  1976    CORONARY STENT SINGLE W/PTCA  9/21/2010         GYN  1996    ovarian cyst removed    GYN  1978    hysterectomy    HERNIA REPAIR

## 2025-01-06 NOTE — PLAN OF CARE
Problem: Chronic Conditions and Co-morbidities  Goal: Patient's chronic conditions and co-morbidity symptoms are monitored and maintained or improved  1/6/2025 0017 by Martín Soliz RN  Outcome: Progressing  1/5/2025 1400 by Scotty Melara RN  Outcome: Progressing  1/5/2025 1111 by Scotty Melara RN  Outcome: Progressing  Flowsheets (Taken 1/5/2025 0735)  Care Plan - Patient's Chronic Conditions and Co-Morbidity Symptoms are Monitored and Maintained or Improved: Monitor and assess patient's chronic conditions and comorbid symptoms for stability, deterioration, or improvement     Problem: Discharge Planning  Goal: Discharge to home or other facility with appropriate resources  1/6/2025 0017 by Martín Soliz RN  Outcome: Progressing  1/5/2025 1400 by Scotty Melara RN  Outcome: Progressing  1/5/2025 1111 by Scotty Melara RN  Outcome: Progressing  Flowsheets (Taken 1/5/2025 0735)  Discharge to home or other facility with appropriate resources: Identify barriers to discharge with patient and caregiver     Problem: Safety - Adult  Goal: Free from fall injury  1/6/2025 0017 by Martín Soliz RN  Outcome: Progressing  1/5/2025 1400 by Scotty Melara RN  Outcome: Progressing  1/5/2025 1111 by Scotty Melara RN  Outcome: Progressing     Problem: ABCDS Injury Assessment  Goal: Absence of physical injury  1/6/2025 0017 by Martín Soliz RN  Outcome: Progressing  1/5/2025 1400 by Scotty Melara RN  Outcome: Progressing  1/5/2025 1111 by Scotty Melara RN  Outcome: Progressing     Problem: Pain  Goal: Verbalizes/displays adequate comfort level or baseline comfort level  1/6/2025 0017 by Martín Soliz RN  Outcome: Progressing  1/5/2025 1400 by Scotty Melara RN  Outcome: Progressing  1/5/2025 1111 by Scotty Melara RN  Outcome: Progressing

## 2025-01-06 NOTE — PLAN OF CARE
Problem: Physical Therapy - Adult  Goal: By Discharge: Performs mobility at highest level of function for planned discharge setting.  See evaluation for individualized goals.  Description: FUNCTIONAL STATUS PRIOR TO ADMISSION: Patient was modified independent using a rollator and single point cane for functional mobility. Questionable historian.    HOME SUPPORT PRIOR TO ADMISSION: The patient lived with multiple family members that assist as needed.    Physical Therapy Goals  Initiated 1/5/2025  1.  Patient will move from supine to sit and sit to supine, scoot up and down, and roll side to side in bed with modified independence within 7 day(s).    2.  Patient will perform sit to stand with minimal assistance within 7 day(s).  3.  Patient will transfer from bed to chair and chair to bed with minimal assistance using the least restrictive device within 7 day(s).  4.  Patient will ambulate with minimal assistance for 10 feet with the least restrictive device within 7 day(s).   5.  Will assess stair goal when more appropriate.    Outcome: Progressing   PHYSICAL THERAPY TREATMENT    Patient: Amanda Barkley (74 y.o. female)  Date: 1/6/2025  Diagnosis: Dysarthria [R47.1]  Limb tremor [R25.1]  Facial droop [R29.810] Dysarthria      Precautions: Fall Risk                      ASSESSMENT:  Patient continues to benefit from skilled PT services and is slowly progressing towards goals. Patient demonstrates improved bed mobility today demonstrating the ability to transition supine to sit with use of bed rail. She scoots forward and demonstrates the ability to change her top with SBA. Patient requests to use her SPC today. She stands with Mod Ax2 demonstrating poor standing balance. Patient demonstrates decreased anterior weight shift, increased posterior lean, and increased trunk sway. When she attempts to weight shift to widen her TACHO she demonstrates markedly decreased coordination with elevated foot and requires Max Ax2

## 2025-01-06 NOTE — CARE COORDINATION
Care Management Initial Assessment  1/6/2025 12:24 PM  If patient is discharged prior to next notation, then this note serves as note for discharge by case management.    Reason for Admission:   Dysarthria [R47.1]  Limb tremor [R25.1]  Facial droop [R29.810]         Patient Admission Status: Inpatient  Date Admitted to [place]: 1/3  RUR: Readmission Risk Score: 9.7    Hospitalization in the last 30 days (Readmission):  No        Advance Care Planning:  Code Status: Full Code  Primary Healthcare Decision Maker:     Advance Directive: has NO advanced directive - not interested in additional information     __________________________________________________________________________  Assessment:      01/06/25 1222   Service Assessment   Patient Orientation Alert and Oriented   Cognition Alert   History Provided By Medical Record   Support Systems Children   Prior Functional Level Independent in ADLs/IADLs   Discharge Planning   Patient expects to be discharged to: Acute rehab   Services At/After Discharge   Mode of Transport at Discharge Other (see comment)  (TBD)   Confirm Follow Up Transport Family     Comments: Patient with low readmission risk score of 10%. Therapy recs for IPR, will need patient choice (patient off unit for testing) and auth. CM to follow up when patient returns to unit.     Discharge Concerns: []Yes [x]No []Unknown   Describe:    Financial concerns/barriers: []Yes, explain: []No [x]Unknown/Not discussed  __________________________________________________________________________    Insurer:   Active Insurance as of 1/3/2025       Primary Coverage       Payor Plan Insurance Group Employer/Plan Group    [facility] [id number]       Payor Plan Address Payor Plan Phone Number Payor Plan Fax Number Effective Dates    [address] [phone]  1/1/2025 - None Entered    [facility] [id number]         Subscriber Name Subscriber Birth Date Member ID       YUNIEL SÁNCHEZ [id number]

## 2025-01-07 LAB
GLUCOSE BLD STRIP.AUTO-MCNC: 179 MG/DL (ref 65–117)
GLUCOSE BLD STRIP.AUTO-MCNC: 250 MG/DL (ref 65–117)
GLUCOSE BLD STRIP.AUTO-MCNC: 312 MG/DL (ref 65–117)
GLUCOSE BLD STRIP.AUTO-MCNC: 315 MG/DL (ref 65–117)
GLUCOSE BLD STRIP.AUTO-MCNC: 452 MG/DL (ref 65–117)
SERVICE CMNT-IMP: ABNORMAL

## 2025-01-07 PROCEDURE — 94640 AIRWAY INHALATION TREATMENT: CPT

## 2025-01-07 PROCEDURE — 92526 ORAL FUNCTION THERAPY: CPT

## 2025-01-07 PROCEDURE — 2500000003 HC RX 250 WO HCPCS: Performed by: INTERNAL MEDICINE

## 2025-01-07 PROCEDURE — 82962 GLUCOSE BLOOD TEST: CPT

## 2025-01-07 PROCEDURE — 6370000000 HC RX 637 (ALT 250 FOR IP): Performed by: NURSE PRACTITIONER

## 2025-01-07 PROCEDURE — 6370000000 HC RX 637 (ALT 250 FOR IP): Performed by: FAMILY MEDICINE

## 2025-01-07 PROCEDURE — 2060000000 HC ICU INTERMEDIATE R&B

## 2025-01-07 PROCEDURE — 97530 THERAPEUTIC ACTIVITIES: CPT | Performed by: PHYSICAL THERAPIST

## 2025-01-07 PROCEDURE — 97530 THERAPEUTIC ACTIVITIES: CPT

## 2025-01-07 PROCEDURE — 6370000000 HC RX 637 (ALT 250 FOR IP): Performed by: PSYCHIATRY & NEUROLOGY

## 2025-01-07 PROCEDURE — 6370000000 HC RX 637 (ALT 250 FOR IP): Performed by: INTERNAL MEDICINE

## 2025-01-07 PROCEDURE — 94761 N-INVAS EAR/PLS OXIMETRY MLT: CPT

## 2025-01-07 PROCEDURE — 97535 SELF CARE MNGMENT TRAINING: CPT

## 2025-01-07 RX ORDER — INSULIN LISPRO 100 [IU]/ML
6 INJECTION, SOLUTION INTRAVENOUS; SUBCUTANEOUS ONCE
Status: COMPLETED | OUTPATIENT
Start: 2025-01-07 | End: 2025-01-07

## 2025-01-07 RX ORDER — IPRATROPIUM BROMIDE AND ALBUTEROL SULFATE 2.5; .5 MG/3ML; MG/3ML
1 SOLUTION RESPIRATORY (INHALATION) EVERY 4 HOURS PRN
Status: DISCONTINUED | OUTPATIENT
Start: 2025-01-07 | End: 2025-01-10 | Stop reason: HOSPADM

## 2025-01-07 RX ORDER — IPRATROPIUM BROMIDE AND ALBUTEROL SULFATE 2.5; .5 MG/3ML; MG/3ML
1 SOLUTION RESPIRATORY (INHALATION)
Status: DISCONTINUED | OUTPATIENT
Start: 2025-01-07 | End: 2025-01-07

## 2025-01-07 RX ADMIN — SODIUM CHLORIDE, PRESERVATIVE FREE 10 ML: 5 INJECTION INTRAVENOUS at 21:10

## 2025-01-07 RX ADMIN — VALACYCLOVIR HYDROCHLORIDE 1000 MG: 500 TABLET, FILM COATED ORAL at 10:54

## 2025-01-07 RX ADMIN — VALACYCLOVIR HYDROCHLORIDE 1000 MG: 500 TABLET, FILM COATED ORAL at 21:09

## 2025-01-07 RX ADMIN — SODIUM CHLORIDE, PRESERVATIVE FREE 10 ML: 5 INJECTION INTRAVENOUS at 10:55

## 2025-01-07 RX ADMIN — TIZANIDINE 2 MG: 2 TABLET ORAL at 21:16

## 2025-01-07 RX ADMIN — ATORVASTATIN CALCIUM 80 MG: 20 TABLET, FILM COATED ORAL at 21:09

## 2025-01-07 RX ADMIN — INSULIN LISPRO 3 UNITS: 100 INJECTION, SOLUTION INTRAVENOUS; SUBCUTANEOUS at 17:20

## 2025-01-07 RX ADMIN — ACETAMINOPHEN 650 MG: 325 TABLET ORAL at 15:56

## 2025-01-07 RX ADMIN — INSULIN LISPRO 6 UNITS: 100 INJECTION, SOLUTION INTRAVENOUS; SUBCUTANEOUS at 21:10

## 2025-01-07 RX ADMIN — IPRATROPIUM BROMIDE AND ALBUTEROL SULFATE 1 DOSE: 2.5; .5 SOLUTION RESPIRATORY (INHALATION) at 15:55

## 2025-01-07 RX ADMIN — INSULIN LISPRO 2 UNITS: 100 INJECTION, SOLUTION INTRAVENOUS; SUBCUTANEOUS at 13:05

## 2025-01-07 RX ADMIN — ASPIRIN 81 MG: 81 TABLET, CHEWABLE ORAL at 10:54

## 2025-01-07 RX ADMIN — PREDNISONE 60 MG: 20 TABLET ORAL at 10:54

## 2025-01-07 RX ADMIN — GABAPENTIN 100 MG: 100 CAPSULE ORAL at 21:09

## 2025-01-07 RX ADMIN — GABAPENTIN 100 MG: 100 CAPSULE ORAL at 10:54

## 2025-01-07 RX ADMIN — VALACYCLOVIR HYDROCHLORIDE 1000 MG: 500 TABLET, FILM COATED ORAL at 15:46

## 2025-01-07 ASSESSMENT — PAIN SCALES - GENERAL
PAINLEVEL_OUTOF10: 9
PAINLEVEL_OUTOF10: 7
PAINLEVEL_OUTOF10: 9
PAINLEVEL_OUTOF10: 4
PAINLEVEL_OUTOF10: 0
PAINLEVEL_OUTOF10: 0

## 2025-01-07 ASSESSMENT — PAIN DESCRIPTION - LOCATION
LOCATION: FOOT
LOCATION: FOOT;TOE (COMMENT WHICH ONE);LEG
LOCATION: FOOT

## 2025-01-07 ASSESSMENT — PAIN DESCRIPTION - ORIENTATION
ORIENTATION: RIGHT;LEFT
ORIENTATION: RIGHT;LEFT

## 2025-01-07 ASSESSMENT — PAIN DESCRIPTION - DESCRIPTORS
DESCRIPTORS: BURNING
DESCRIPTORS: ACHING;BURNING;CRAMPING
DESCRIPTORS: BURNING

## 2025-01-07 ASSESSMENT — PAIN DESCRIPTION - PAIN TYPE: TYPE: CHRONIC PAIN

## 2025-01-07 NOTE — PLAN OF CARE
Problem: Occupational Therapy - Adult  Goal: By Discharge: Performs self-care activities at highest level of function for planned discharge setting.  See evaluation for individualized goals.  Description: FUNCTIONAL STATUS PRIOR TO ADMISSION:  Patient is questionable historian.  She lives with grandson and DIL.  She reports mod indep with mobility at rollator level and then can when rollator will not fit in parts of apartment.  She reports having someone to assist her with ADLs but was questionable regarding recent need vs long term needs.    , Prior Level of Assist for ADLs: Needs assistance,  ,  ,  ,  ,  , Prior Level of Assist for Homemaking: Needs assistance,  ,  , Active : No     HOME SUPPORT: Patient lived family.  She reports her son wants her to move in with him.     Occupational Therapy Goals:  Initiated 1/5/2025  1.  Patient will perform grooming in unsupported sitting with Supervision within 7 day(s).  2.  Patient will perform upper body dressing with Supervision within 7 day(s).  3.  Patient will perform lower body dressing with Moderate Assist within 7 day(s).  4.  Patient will perform toilet transfers to [facility] with Moderate Assist  within 7 day(s).  5.  Patient will perform all aspects of toileting with Moderate Assist within 7 day(s).  6.  Patient will participate in upper extremity therapeutic exercise/activities with Supervision for 5 minutes within 7 day(s).    7.  Patient will participate in Fugl Vernon assessment within 7 day(s).  Outcome: Progressing     OCCUPATIONAL THERAPY TREATMENT  Patient: Amanda Barkley (74 y.o. female)  Date: 1/7/2025  Primary Diagnosis: Dysarthria [R47.1]  Limb tremor [R25.1]  Facial droop [R29.810]       Precautions: Fall Risk                Chart, occupational therapy assessment, plan of care, and goals were reviewed.    ASSESSMENT  Patient continues to benefit from skilled OT services and is slowly progressing towards goals. Patient with some improvement in  sitting balance this session in order to participate in upper and lower body bathing tasks, UB dressing, and grooming tasks. Still with occasional shaking tremor-type movements noted but no leaning/seated LOB this date. Improving standing tolerance but still requires significant assist x 2 for close SPT to chair. She participates in target accuracy task with improving coordination noted and better visual attention noted.       PLAN :  Patient continues to benefit from skilled intervention to address the above impairments.  Continue treatment per established plan of care to address goals.    Recommend with staff: Krystenannie Walters for transfer to bed, chair, or BSC with assist x 2 and gait belt    Recommend next OT session: continue towards established OT goals    Recommendation for discharge: (in order for the patient to meet his/her long term goals):   Moderate intensity short-term skilled occupational therapy up to 5x/week    Other factors to consider for discharge: patient's current support system is unable to meet their requirements for physical assistance, high risk for falls, not safe to be alone, and concern for safely navigating or managing the home environment    IF patient discharges home will need the following DME: continuing to assess with progress       SUBJECTIVE:   Patient stated “I feel a little better today.”    OBJECTIVE DATA SUMMARY:   Cognitive/Behavioral Status:  Orientation  Overall Orientation Status: Within Normal Limits  Orientation Level: Oriented X4  Cognition  Following Commands: Follows all commands without difficulty  Attention Span: Appears intact  Safety Judgement: Decreased awareness of need for safety  Problem Solving: Impaired  Insights: Decreased awareness of deficits  Initiation: Requires cues for some  Sequencing: Requires cues for some    Functional Mobility and Transfers for ADLs:  Bed Mobility:  Bed Mobility Training  Supine to Sit: Minimum assistance  Sit to Supine: Minimum

## 2025-01-07 NOTE — CARE COORDINATION
Care Management Progress Note    Reason for Admission:   Dysarthria [R47.1]  Limb tremor [R25.1]  Facial droop [R29.810]         Patient Admission Status: Inpatient  RUR:   Hospitalization in the last 30 days (Readmission):  No        Transition of care plan:     01/07/25 1337   Condition of Participation: Discharge Planning   The Patient and/or Patient Representative was provided with a Choice of Provider? Patient   The Patient and/Or Patient Representative agree with the Discharge Plan? Yes   Freedom of Choice list was provided with basic dialogue that supports the patient's individualized plan of care/goals, treatment preferences, and shares the quality data associated with the providers?  Yes     Patient with recs for IPR, met with patient and GS at bedside. Patient and GS would like referral sent to Brielle Mora for IPR therapy at [place]. Patient will need auth. Referral sent via [facility] per family preference.  ______________________  Yadira PARRA, RN  Care Management  1/7/2025

## 2025-01-07 NOTE — PLAN OF CARE
Problem: Physical Therapy - Adult  Goal: By Discharge: Performs mobility at highest level of function for planned discharge setting.  See evaluation for individualized goals.  Description: FUNCTIONAL STATUS PRIOR TO ADMISSION: Patient was modified independent using a rollator and single point cane for functional mobility. Questionable historian.    HOME SUPPORT PRIOR TO ADMISSION: The patient lived with multiple family members that assist as needed.    Physical Therapy Goals  Initiated 1/5/2025  1.  Patient will move from supine to sit and sit to supine, scoot up and down, and roll side to side in bed with modified independence within 7 day(s).    2.  Patient will perform sit to stand with minimal assistance within 7 day(s).  3.  Patient will transfer from bed to chair and chair to bed with minimal assistance using the least restrictive device within 7 day(s).  4.  Patient will ambulate with minimal assistance for 10 feet with the least restrictive device within 7 day(s).   5.  Will assess stair goal when more appropriate.    Outcome: Progressing   PHYSICAL THERAPY TREATMENT    Patient: Amanda Barkley (74 y.o. female)  Date: 1/7/2025  Diagnosis: Dysarthria [R47.1]  Limb tremor [R25.1]  Facial droop [R29.810] Dysarthria      Precautions: Fall Risk                      ASSESSMENT:  Patient continues to benefit from skilled PT services and is slowly progressing towards goals. Patient overall limited by ataxia, ballistic movement at times, poor coordination, dysmetria, gait instability, impaired balance and decreased activity tolerance.  Overall patient requires Ximena for bed mobility and has good sitting balance initially.  Tends to lose balance intermittently with no warning with Ximena to recover.  Sit to stand with modA x 2 and cues for forward weight shift.  In standing displays B LE buckling, ataxic movement and scissoring at times.  Able to transfer to the chair with modA-maxA x 2 using RW and cues for RW  Moderate assistance;Assist X2  Distance (ft): 2 Feet  Assistive Device: Gait belt;Walker, rolling  Base of Support: Narrowed;Center of gravity altered  Speed/Charisse: Slow  Step Length: Left shortened;Right shortened  Gait Abnormalities: Decreased step clearance;Ataxic;Scissoring (LE's buckling)        Neuro Re-Education:                    Pain Rating:  Reports some pain in LE's from neuropathy  Pain Intervention(s):       Activity Tolerance:   Fair  and requires rest breaks    After treatment:   Patient left in no apparent distress sitting up in chair, Call bell within reach, and Bed/ chair alarm activated      COMMUNICATION/EDUCATION:   The patient's plan of care was discussed with: physical therapist, occupational therapist, and registered nurse           Saba Wright, PT, DPT  Minutes: 23

## 2025-01-07 NOTE — PLAN OF CARE
Speech LAnguage Pathology TREATMENT    Patient: Amanda Barkley (74 y.o. female)  Date: 1/7/2025  Primary Diagnosis: Dysarthria [R47.1]  Limb tremor [R25.1]  Facial droop [R29.810]       Precautions:  Fall Risk                  ASSESSMENT :  Patient tolerating regular diet, thin liquids without s/s aspiration. She still has intermittent effortful swallow that must be related to esophageal issues instead of pharyngeal (based on MBS results). No difficulty taking pills per RN.   Still has mild L labial-facial flaccidity and minimal dysarthria.     Patient will benefit from skilled intervention to address the above impairments.     PLAN :  Recommendations and Planned Interventions:  Diet: Regular and thin liquids  Upright for all PO         Acute SLP Services: Yes, SLP will continue to follow per plan of care.  Discharge Recommendations: Yes, recommend SLP treatment at next level of care     SUBJECTIVE:   Patient and grandson talking about IPR    OBJECTIVE:     Past Medical History:   Diagnosis Date    CAD (coronary artery disease) 6/18/2010    Chronic pain     lower back    COPD (chronic obstructive pulmonary disease) (HCC) 8/27/2010    Diabetes ([facility])     type II    Diabetic gastroparesis ([facility]) 7/7/2010    Headache(784.0) 10/7/2010    HTN (hypertension) 6/18/2010    Other ill-defined conditions(799.89)     mixed hyperlipidemia     Past Surgical History:   Procedure Laterality Date    CHOLECYSTECTOMY  1976    CORONARY STENT SINGLE W/PTCA  9/21/2010         GYN  1996    ovarian cyst removed    GYN  1978    hysterectomy    HERNIA REPAIR      LEFT HEART CATH,PERCUTANEOUS  9/21/2010         LV ANGIOGRAPHY  9/21/2010         NEUROLOGICAL SURGERY  1997    disc fusion    [place] UNLISTED PROCEDURE CARDIAC SURGERY      stent     Prior Level of Function/Home Situation:   Social/Functional History  Lives With: Family (grandson and DIL)  Type of Home: Apartment  Home Layout: One level  Home Access: Level entry  Bathroom

## 2025-01-07 NOTE — PROGRESS NOTES
[facility]  [address] [place] [address] [phone]    [facility] Adult  Hospitalist Group                                                                                          Hospitalist Progress Note  Jazmyn Cid MD        Date of Service:  2025  NAME:  Amanda Barkley  :  1950  MRN:  [id number]      Admission Summary:   74-year-old female is admitted with slurred speech and difficulty swallowing and left facial droop    Interval history / Subjective:   Facial droop much improved this morning.  No new complaints     Assessment & Plan:     Dysarthria/left facial droop  Bell's palsy  -CT and CTA head and neck without any concerns  -Brain MRI without acute stroke  -, A1c 7.8  -Continue aspirin and statin due to prior history of strokes  -Neurology evaluated, started prednisone and valacyclovir  -Started scheduled gabapentin for pain    Type 2 diabetes  -A1c 7.8  -Continue SSI per protocol    Chronic COPD  Chronic respiratory failure  -Continue home meds  -On 2 L nasal cannula at baseline    CAD  -Continue aspirin    Generalized weakness  -PT/OT are recommending IPR once stable    Outisde Records, prior notes, labs, radiology, and medications reviewed     Code status: Full code  DVT prophylaxis: [facility] Problems             Last Modified POA    * (Principal) Dysarthria 1/3/2025 Yes    DM (diabetes mellitus) ([facility]) 1/3/2025 Yes    HTN (hypertension) 1/3/2025 Yes    COPD (chronic obstructive pulmonary disease) ([facility]) 1/3/2025 Yes    Hypercholesterolemia 1/3/2025 Yes    CAD (coronary artery disease) 1/3/2025 Yes          Review of Systems:   Pertinent items are noted in HPI.       Vital Signs:    Last 24hrs VS reviewed since prior progress note. Most recent are:  Vitals:    25 1138   BP: (!) 150/60   Pulse: 84   Resp: 16   Temp: 97.7 °F (36.5 °C)   SpO2: 97%         Intake/Output Summary (Last 24 hours) at

## 2025-01-08 LAB
GLUCOSE BLD STRIP.AUTO-MCNC: 182 MG/DL (ref 65–117)
GLUCOSE BLD STRIP.AUTO-MCNC: 204 MG/DL (ref 65–117)
GLUCOSE BLD STRIP.AUTO-MCNC: 284 MG/DL (ref 65–117)
GLUCOSE BLD STRIP.AUTO-MCNC: 330 MG/DL (ref 65–117)
SERVICE CMNT-IMP: ABNORMAL

## 2025-01-08 PROCEDURE — 97530 THERAPEUTIC ACTIVITIES: CPT

## 2025-01-08 PROCEDURE — 1100000000 HC RM PRIVATE

## 2025-01-08 PROCEDURE — 2500000003 HC RX 250 WO HCPCS: Performed by: INTERNAL MEDICINE

## 2025-01-08 PROCEDURE — 97535 SELF CARE MNGMENT TRAINING: CPT

## 2025-01-08 PROCEDURE — 6370000000 HC RX 637 (ALT 250 FOR IP): Performed by: PSYCHIATRY & NEUROLOGY

## 2025-01-08 PROCEDURE — 82962 GLUCOSE BLOOD TEST: CPT

## 2025-01-08 PROCEDURE — 6370000000 HC RX 637 (ALT 250 FOR IP): Performed by: FAMILY MEDICINE

## 2025-01-08 PROCEDURE — 97116 GAIT TRAINING THERAPY: CPT

## 2025-01-08 PROCEDURE — 6370000000 HC RX 637 (ALT 250 FOR IP): Performed by: INTERNAL MEDICINE

## 2025-01-08 PROCEDURE — 94761 N-INVAS EAR/PLS OXIMETRY MLT: CPT

## 2025-01-08 RX ADMIN — GABAPENTIN 100 MG: 100 CAPSULE ORAL at 21:13

## 2025-01-08 RX ADMIN — SODIUM CHLORIDE, PRESERVATIVE FREE 10 ML: 5 INJECTION INTRAVENOUS at 21:15

## 2025-01-08 RX ADMIN — GABAPENTIN 100 MG: 100 CAPSULE ORAL at 13:39

## 2025-01-08 RX ADMIN — INSULIN LISPRO 1 UNITS: 100 INJECTION, SOLUTION INTRAVENOUS; SUBCUTANEOUS at 10:29

## 2025-01-08 RX ADMIN — INSULIN LISPRO 3 UNITS: 100 INJECTION, SOLUTION INTRAVENOUS; SUBCUTANEOUS at 21:13

## 2025-01-08 RX ADMIN — ATORVASTATIN CALCIUM 80 MG: 20 TABLET, FILM COATED ORAL at 21:13

## 2025-01-08 RX ADMIN — SODIUM CHLORIDE, PRESERVATIVE FREE 10 ML: 5 INJECTION INTRAVENOUS at 10:30

## 2025-01-08 RX ADMIN — PREDNISONE 60 MG: 20 TABLET ORAL at 10:29

## 2025-01-08 RX ADMIN — VALACYCLOVIR HYDROCHLORIDE 1000 MG: 500 TABLET, FILM COATED ORAL at 10:29

## 2025-01-08 RX ADMIN — INSULIN LISPRO 1 UNITS: 100 INJECTION, SOLUTION INTRAVENOUS; SUBCUTANEOUS at 13:38

## 2025-01-08 RX ADMIN — GABAPENTIN 100 MG: 100 CAPSULE ORAL at 10:29

## 2025-01-08 RX ADMIN — VALACYCLOVIR HYDROCHLORIDE 1000 MG: 500 TABLET, FILM COATED ORAL at 13:38

## 2025-01-08 RX ADMIN — ASPIRIN 81 MG: 81 TABLET, CHEWABLE ORAL at 10:29

## 2025-01-08 RX ADMIN — VALACYCLOVIR HYDROCHLORIDE 1000 MG: 500 TABLET, FILM COATED ORAL at 21:13

## 2025-01-08 RX ADMIN — INSULIN LISPRO 2 UNITS: 100 INJECTION, SOLUTION INTRAVENOUS; SUBCUTANEOUS at 17:11

## 2025-01-08 NOTE — PLAN OF CARE
Problem: Occupational Therapy - Adult  Goal: By Discharge: Performs self-care activities at highest level of function for planned discharge setting.  See evaluation for individualized goals.  Description: FUNCTIONAL STATUS PRIOR TO ADMISSION:  Patient is questionable historian.  She lives with grandson and DIL.  She reports mod indep with mobility at rollator level and then can when rollator will not fit in parts of apartment.  She reports having someone to assist her with ADLs but was questionable regarding recent need vs long term needs.    , Prior Level of Assist for ADLs: Needs assistance,  ,  ,  ,  ,  , Prior Level of Assist for Homemaking: Needs assistance,  ,  , Active : No     HOME SUPPORT: Patient lived family.  She reports her son wants her to move in with him.     Occupational Therapy Goals:  Initiated 1/5/2025  1.  Patient will perform grooming in unsupported sitting with Supervision within 7 day(s).  2.  Patient will perform upper body dressing with Supervision within 7 day(s).  3.  Patient will perform lower body dressing with Moderate Assist within 7 day(s).  4.  Patient will perform toilet transfers to [facility] with Moderate Assist  within 7 day(s).  5.  Patient will perform all aspects of toileting with Moderate Assist within 7 day(s).  6.  Patient will participate in upper extremity therapeutic exercise/activities with Supervision for 5 minutes within 7 day(s).    7.  Patient will participate in Fugl Vernon assessment within 7 day(s).  Outcome: Progressing     OCCUPATIONAL THERAPY TREATMENT  Patient: Amanda Barkley (74 y.o. female)  Date: 1/8/2025  Primary Diagnosis: Dysarthria [R47.1]  Limb tremor [R25.1]  Facial droop [R29.810]       Precautions: Fall Risk                Chart, occupational therapy assessment, plan of care, and goals were reviewed.    ASSESSMENT  Patient continues to benefit from skilled OT services and is slowly progressing towards goals. Patient able to complete transfer

## 2025-01-08 NOTE — PROGRESS NOTES
0700 : Bedside and Verbal shift change report given to Seema RN (oncoming nurse) by Una RN (offgoing nurse). Report included the following information Nurse Handoff Report, Recent Results, Cardiac Rhythm NSR, and Event Log.       1800 : TRANSFER - OUT REPORT:    Verbal report given to Gilma WIGGINS on Amanda VENTURA [place]  being transferred to [facility] for routine progression of patient care       Report consisted of patient's Situation, Background, Assessment and   Recommendations(SBAR).     Information from the following report(s) Nurse Handoff Report, Recent Results, Cardiac Rhythm NSR, and Event Log was reviewed with the receiving nurse.           Lines:   Peripheral IV 01/03/25 Left;Anterior Forearm (Active)   Site Assessment Clean, dry & intact 01/08/25 1600   Line Status Capped 01/08/25 1600   Line Care Connections checked and tightened 01/08/25 1600   Phlebitis Assessment No symptoms 01/08/25 1600   Infiltration Assessment 0 01/08/25 1600   Alcohol Cap Used Yes 01/08/25 1600   Dressing Status Clean, dry & intact 01/08/25 1600   Dressing Type Transparent 01/08/25 1600   Dressing Intervention New 01/03/25 1356        Opportunity for questions and clarification was provided.      Patient transported with:  Registered Nurse and Tech

## 2025-01-08 NOTE — CARE COORDINATION
Care Management Progress Note    Reason for Admission:   Dysarthria [R47.1]  Limb tremor [R25.1]  Facial droop [R29.810]         Patient Admission Status: Inpatient  RUR:   Hospitalization in the last 30 days (Readmission):  No        Transition of care plan:  Update from Saba, liaison with [facility]. Per Saba insurance auth still pending. KARLA 1/9 pending auth. CM following for needs.  ______________________  Yadira PARRA, RN  Care Management  1/8/2025

## 2025-01-08 NOTE — PLAN OF CARE
Problem: Chronic Conditions and Co-morbidities  Goal: Patient's chronic conditions and co-morbidity symptoms are monitored and maintained or improved  Outcome: Progressing  Flowsheets (Taken 1/8/2025 0815)  Care Plan - Patient's Chronic Conditions and Co-Morbidity Symptoms are Monitored and Maintained or Improved: Monitor and assess patient's chronic conditions and comorbid symptoms for stability, deterioration, or improvement     Problem: Discharge Planning  Goal: Discharge to home or other facility with appropriate resources  Outcome: Progressing  Flowsheets (Taken 1/8/2025 0815)  Discharge to home or other facility with appropriate resources:   Identify barriers to discharge with patient and caregiver   Arrange for needed discharge resources and transportation as appropriate     Problem: Safety - Adult  Goal: Free from fall injury  Outcome: Progressing     Problem: ABCDS Injury Assessment  Goal: Absence of physical injury  Outcome: Progressing     Problem: Pain  Goal: Verbalizes/displays adequate comfort level or baseline comfort level  Outcome: Progressing     Problem: Skin/Tissue Integrity  Goal: Absence of new skin breakdown  Description: 1.  Monitor for areas of redness and/or skin breakdown  2.  Assess vascular access sites hourly  3.  Every 4-6 hours minimum:  Change oxygen saturation probe site  4.  Every 4-6 hours:  If on nasal continuous positive airway pressure, respiratory therapy assess nares and determine need for appliance change or resting period.  Outcome: Progressing     Problem: Occupational Therapy - Adult  Goal: By Discharge: Performs self-care activities at highest level of function for planned discharge setting.  See evaluation for individualized goals.  Description: FUNCTIONAL STATUS PRIOR TO ADMISSION:  Patient is questionable historian.  She lives with grandson and DIL.  She reports mod indep with mobility at rollator level and then can when rollator will not fit in parts of apartment.

## 2025-01-08 NOTE — PROGRESS NOTES
Hospitalist Progress Note      NAME:  Amanda Barkley   :  1950  MRM:  [id number]    Date/Time: 2025  3:55 PM           Assessment / Plan:     74-year-old female is admitted with slurred speech and difficulty swallowing and left facial droop. Now pending placement to IPR    Dysarthria/left facial droop  Bell's palsy  - CT and CTA head and neck without any concerns  - Brain MRI without acute stroke  - , A1c 7.8  - Continue aspirin and statin due to prior history of strokes  - Neurology evaluated, started prednisone and valacyclovir  - Started scheduled gabapentin for pain  - PT/OT are recommending IPR once stable  - On gabapentin      Type 2 diabetes  - A1c 7.8  - Continue SSI per protocol     Chronic COPD  Chronic respiratory failure  - Continue home meds  - On 2 L nasal cannula at baseline     CAD  - Continue aspirin         #BMI (Calculated): 26.19    I have personally reviewed the radiographs, laboratory data in Epic and decisions and statements above are based partially on this personal interpretation.                 Care Plan discussed with: Patient, Care Manager, and Nursing Staff    Discussed:  Care Plan and D/C Planning    Prophylaxis:  Lovenox    Disposition:  SAH/Rehab    Total time spent with patient care: 30 Minutes **I personally saw and examined the patient during this time period**           ___________________________________________________    Attending Physician: Chool phillip MD        Subjective:     Chief Complaint:  She states her face is better    ROS:  12+ ROS reviewed with patient and negative with exception of above           Objective:       Vitals:          Last 24hrs VS reviewed since prior progress note. Most recent are:    Vitals:    25 1127   BP: (!) 130/53   Pulse:    Resp:    Temp:    SpO2:      SpO2 Readings from Last 6 Encounters:   25 96%          Intake/Output Summary (Last 24 hours) at 2025 4829  Last data filed at 2025 8334  Gross  sodium chloride infusion   IntraVENous PRN    ondansetron (ZOFRAN-ODT) disintegrating tablet 4 mg  4 mg Oral Q8H PRN    Or    ondansetron (ZOFRAN) injection 4 mg  4 mg IntraVENous Q6H PRN    polyethylene glycol (GLYCOLAX) packet 17 g  17 g Oral Daily PRN    [Held by provider] enoxaparin (LOVENOX) injection 40 mg  40 mg SubCUTAneous Daily    atorvastatin (LIPITOR) tablet 80 mg  80 mg Oral Nightly    insulin lispro (HUMALOG,ADMELOG) injection vial 0-4 Units  0-4 Units SubCUTAneous 4x Daily AC & HS    hydrALAZINE (APRESOLINE) injection 10 mg  10 mg IntraVENous Q6H PRN    aspirin chewable tablet 81 mg  81 mg Oral Daily            Lab Review:     No results for input(s): \"WBC\", \"HGB\", \"HCT\", \"PLT\" in the last 72 hours.  No results for input(s): \"NA\", \"K\", \"CL\", \"CO2\", \"GLU\", \"BUN\", \"MG\", \"PHOS\", \"ALT\", \"INR\" in the last 72 hours.    Invalid input(s): \"CREA\", \"CA\", \"ALB\", \"TBIL\", \"SGOT\"  No components found for: \"GLPOC\"

## 2025-01-08 NOTE — PLAN OF CARE
and notified nurse who agreed to monitor patient.        PLAN:  Patient continues to benefit from skilled intervention to address the above impairments.  Continue treatment per established plan of care.    Recommendations for staff mobility and toileting assistance:  Recommend that staff completes patient mobility with assist x1 using gait belt and rolling walker.    Recommend for next PT session: further progression of gait with existing device    Recommendation for discharge: (in order for the patient to meet his/her long term goals):   High intensity/comprehensive skilled physical therapy in a multidisciplinary setting as patient is working towards tolerating up to 3 hours of therapy/day 5-7x/week    Other factors to consider for discharge: no additional factors    IF patient discharges home will need the following DME: patient owns DME required for discharge       SUBJECTIVE:   Patient stated, \"I want to go back to bed.\"    OBJECTIVE DATA SUMMARY:   Critical Behavior:  Orientation  Overall Orientation Status: Within Normal Limits  Orientation Level: Oriented X4  Cognition  Arousal/Alertness: Appears intact  Following Commands: Follows all commands without difficulty  Attention Span: Appears intact  Memory: Impaired;Decreased short term memory;Decreased recall of recent events  Safety Judgement: Appears intact  Insights: Decreased awareness of deficits  Initiation: Requires cues for some  Sequencing: Requires cues for some    Functional Mobility Training:  Bed Mobility:  Bed Mobility Training  Bed Mobility Training: Yes  Overall Level of Assistance: Minimum assistance  Interventions: Safety awareness training  Rolling: Minimum assistance  Supine to Sit: Minimum assistance  Sit to Supine: Minimum assistance  Scooting: Minimum assistance  Transfers:  Transfer Training  Transfer Training: Yes  Overall Level of Assistance: Minimum assistance  Interventions: Safety awareness training  Sit to Stand: Minimum  assistance  Stand to Sit: Minimum assistance  Stand Pivot Transfers: Minimum assistance  Bed to Chair: Minimum assistance  Balance:  Balance  Sitting: Intact  Sitting - Static: Good (unsupported)  Sitting - Dynamic: Good (unsupported)  Standing: Impaired  Standing - Static: Constant support;Fair  Standing - Dynamic: Constant support;Poor   Ambulation/Gait Training:     Gait  Gait Training: Yes  Overall Level of Assistance: Minimum assistance  Distance (ft): 5 Feet  Assistive Device: Gait belt;Walker, rolling  Interventions: Safety awareness training  Base of Support: Narrowed  Speed/Charisse: Slow  Step Length: Right shortened;Left shortened  Gait Abnormalities: Path deviations;Step to gait        Neuro Re-Education:                    Pain Ratin/10   Pain Intervention(s):   nursing notified and addressing    Activity Tolerance:   Good    After treatment:   Patient left in no apparent distress in bed, Call bell within reach, Bed/ chair alarm activated, Caregiver / family present, Side rails x3, and Heels elevated for pressure relief      COMMUNICATION/EDUCATION:   The patient's plan of care was discussed with: occupational therapist and registered nurse    Patient Education  Education Given To: Patient;Family;Caregiver  Education Provided: Role of Therapy;ADL Adaptive Strategies;IADL Safety;Plan of Care;Transfer Training;Orientation;Fall Prevention Strategies;Family Education;Mobility Training;Home Exercise Program;Precautions;Energy Conservation;Equipment  Education Method: Verbal  Barriers to Learning: None  Education Outcome: Verbalized understanding;Continued education needed      MATTHEW DUDLEY, PT,WCC.  Minutes: 24

## 2025-01-09 PROBLEM — T38.0X5A STEROID-INDUCED HYPERGLYCEMIA: Status: ACTIVE | Noted: 2025-01-09

## 2025-01-09 PROBLEM — R29.810 FACIAL DROOP: Status: ACTIVE | Noted: 2025-01-09

## 2025-01-09 PROBLEM — R73.9 STEROID-INDUCED HYPERGLYCEMIA: Status: ACTIVE | Noted: 2025-01-09

## 2025-01-09 LAB
APPEARANCE UR: ABNORMAL
BACTERIA URNS QL MICRO: ABNORMAL /HPF
BILIRUB UR QL: NEGATIVE
COLOR UR: ABNORMAL
EPITH CASTS URNS QL MICRO: ABNORMAL /LPF
GLUCOSE BLD STRIP.AUTO-MCNC: 129 MG/DL (ref 65–117)
GLUCOSE BLD STRIP.AUTO-MCNC: 154 MG/DL (ref 65–117)
GLUCOSE BLD STRIP.AUTO-MCNC: 161 MG/DL (ref 65–117)
GLUCOSE BLD STRIP.AUTO-MCNC: 192 MG/DL (ref 65–117)
GLUCOSE UR STRIP.AUTO-MCNC: >1000 MG/DL
HGB UR QL STRIP: NEGATIVE
KETONES UR QL STRIP.AUTO: ABNORMAL MG/DL
LEUKOCYTE ESTERASE UR QL STRIP.AUTO: ABNORMAL
NITRITE UR QL STRIP.AUTO: NEGATIVE
PH UR STRIP: 5.5 (ref 5–8)
PROT UR STRIP-MCNC: 100 MG/DL
RBC #/AREA URNS HPF: ABNORMAL /HPF (ref 0–5)
SERVICE CMNT-IMP: ABNORMAL
SP GR UR REFRACTOMETRY: 1.02 (ref 1–1.03)
SPECIMEN HOLD: NORMAL
URINE CULTURE IF INDICATED: ABNORMAL
UROBILINOGEN UR QL STRIP.AUTO: 0.2 EU/DL (ref 0.2–1)
WBC URNS QL MICRO: ABNORMAL /HPF (ref 0–4)
YEAST URNS QL MICRO: PRESENT

## 2025-01-09 PROCEDURE — 97530 THERAPEUTIC ACTIVITIES: CPT

## 2025-01-09 PROCEDURE — 97530 THERAPEUTIC ACTIVITIES: CPT | Performed by: PHYSICAL THERAPIST

## 2025-01-09 PROCEDURE — 97116 GAIT TRAINING THERAPY: CPT | Performed by: PHYSICAL THERAPIST

## 2025-01-09 PROCEDURE — 6370000000 HC RX 637 (ALT 250 FOR IP): Performed by: INTERNAL MEDICINE

## 2025-01-09 PROCEDURE — 6370000000 HC RX 637 (ALT 250 FOR IP)

## 2025-01-09 PROCEDURE — 82962 GLUCOSE BLOOD TEST: CPT

## 2025-01-09 PROCEDURE — 6370000000 HC RX 637 (ALT 250 FOR IP): Performed by: FAMILY MEDICINE

## 2025-01-09 PROCEDURE — 99221 1ST HOSP IP/OBS SF/LOW 40: CPT

## 2025-01-09 PROCEDURE — 6370000000 HC RX 637 (ALT 250 FOR IP): Performed by: PSYCHIATRY & NEUROLOGY

## 2025-01-09 PROCEDURE — 1100000000 HC RM PRIVATE

## 2025-01-09 PROCEDURE — 6360000002 HC RX W HCPCS: Performed by: STUDENT IN AN ORGANIZED HEALTH CARE EDUCATION/TRAINING PROGRAM

## 2025-01-09 PROCEDURE — 87086 URINE CULTURE/COLONY COUNT: CPT

## 2025-01-09 PROCEDURE — 92526 ORAL FUNCTION THERAPY: CPT

## 2025-01-09 PROCEDURE — 6370000000 HC RX 637 (ALT 250 FOR IP): Performed by: STUDENT IN AN ORGANIZED HEALTH CARE EDUCATION/TRAINING PROGRAM

## 2025-01-09 PROCEDURE — 81001 URINALYSIS AUTO W/SCOPE: CPT

## 2025-01-09 PROCEDURE — 2500000003 HC RX 250 WO HCPCS: Performed by: INTERNAL MEDICINE

## 2025-01-09 RX ORDER — TIZANIDINE 2 MG/1
2 TABLET ORAL EVERY 8 HOURS PRN
Status: DISCONTINUED | OUTPATIENT
Start: 2025-01-09 | End: 2025-01-10 | Stop reason: HOSPADM

## 2025-01-09 RX ORDER — DEXTROSE MONOHYDRATE 100 MG/ML
INJECTION, SOLUTION INTRAVENOUS CONTINUOUS PRN
Status: DISCONTINUED | OUTPATIENT
Start: 2025-01-09 | End: 2025-01-10 | Stop reason: HOSPADM

## 2025-01-09 RX ORDER — FLUCONAZOLE 100 MG/1
150 TABLET ORAL
Status: DISCONTINUED | OUTPATIENT
Start: 2025-01-09 | End: 2025-01-10 | Stop reason: HOSPADM

## 2025-01-09 RX ORDER — METHOCARBAMOL 500 MG/1
500 TABLET, FILM COATED ORAL 3 TIMES DAILY PRN
Status: CANCELLED | OUTPATIENT
Start: 2025-01-09

## 2025-01-09 RX ORDER — INSULIN GLARGINE 100 [IU]/ML
0.2 INJECTION, SOLUTION SUBCUTANEOUS NIGHTLY
Status: DISCONTINUED | OUTPATIENT
Start: 2025-01-09 | End: 2025-01-09

## 2025-01-09 RX ORDER — INSULIN GLARGINE 100 [IU]/ML
10 INJECTION, SOLUTION SUBCUTANEOUS NIGHTLY
Status: DISCONTINUED | OUTPATIENT
Start: 2025-01-09 | End: 2025-01-10 | Stop reason: HOSPADM

## 2025-01-09 RX ADMIN — PREDNISONE 60 MG: 20 TABLET ORAL at 09:18

## 2025-01-09 RX ADMIN — INSULIN LISPRO 1 UNITS: 100 INJECTION, SOLUTION INTRAVENOUS; SUBCUTANEOUS at 09:17

## 2025-01-09 RX ADMIN — SODIUM CHLORIDE, PRESERVATIVE FREE 10 ML: 5 INJECTION INTRAVENOUS at 09:18

## 2025-01-09 RX ADMIN — SODIUM CHLORIDE, PRESERVATIVE FREE 10 ML: 5 INJECTION INTRAVENOUS at 20:16

## 2025-01-09 RX ADMIN — ATORVASTATIN CALCIUM 80 MG: 20 TABLET, FILM COATED ORAL at 20:15

## 2025-01-09 RX ADMIN — ENOXAPARIN SODIUM 40 MG: 100 INJECTION SUBCUTANEOUS at 09:17

## 2025-01-09 RX ADMIN — GABAPENTIN 100 MG: 100 CAPSULE ORAL at 09:18

## 2025-01-09 RX ADMIN — TIZANIDINE 2 MG: 2 TABLET ORAL at 11:49

## 2025-01-09 RX ADMIN — GABAPENTIN 100 MG: 100 CAPSULE ORAL at 20:15

## 2025-01-09 RX ADMIN — FLUCONAZOLE 150 MG: 100 TABLET ORAL at 11:49

## 2025-01-09 RX ADMIN — VALACYCLOVIR HYDROCHLORIDE 1000 MG: 500 TABLET, FILM COATED ORAL at 14:13

## 2025-01-09 RX ADMIN — INSULIN GLARGINE 10 UNITS: 100 INJECTION, SOLUTION SUBCUTANEOUS at 20:53

## 2025-01-09 RX ADMIN — GABAPENTIN 100 MG: 100 CAPSULE ORAL at 14:13

## 2025-01-09 RX ADMIN — VALACYCLOVIR HYDROCHLORIDE 1000 MG: 500 TABLET, FILM COATED ORAL at 20:15

## 2025-01-09 RX ADMIN — ASPIRIN 81 MG: 81 TABLET, CHEWABLE ORAL at 09:18

## 2025-01-09 RX ADMIN — TIZANIDINE 2 MG: 2 TABLET ORAL at 20:15

## 2025-01-09 RX ADMIN — VALACYCLOVIR HYDROCHLORIDE 1000 MG: 500 TABLET, FILM COATED ORAL at 09:18

## 2025-01-09 RX ADMIN — HUMAN INSULIN 20 UNITS: 100 INJECTION, SUSPENSION SUBCUTANEOUS at 09:17

## 2025-01-09 ASSESSMENT — PAIN SCALES - GENERAL
PAINLEVEL_OUTOF10: 0
PAINLEVEL_OUTOF10: 0

## 2025-01-09 NOTE — PLAN OF CARE
Speech LAnguage Pathology TREATMENT    Patient: Amanda Barkley (74 y.o. female)  Date: 1/9/2025  Primary Diagnosis: Dysarthria [R47.1]  Limb tremor [R25.1]  Facial droop [R29.810]       Precautions:  Fall Risk           aspiration       ASSESSMENT :  Patient tolerating regular diet, thin liquids without s/s aspiration. Her sensation of L facial numbness has resolved.  Noted minimal L labial-facial flaccidity.       Patient will benefit from skilled intervention to address the above impairments.     PLAN :  Recommendations and Planned Interventions:  Diet: Regular and thin liquids  Upright for all PO         Acute SLP Services: Yes, SLP will continue to follow per plan of care.  Discharge Recommendations: No, additional SLP treatment not indicated at discharge     SUBJECTIVE:   Patient stated, “I think we're waiting for insurance.”-for rehab    OBJECTIVE:     Past Medical History:   Diagnosis Date    CAD (coronary artery disease) 6/18/2010    Chronic pain     lower back    COPD (chronic obstructive pulmonary disease) ([facility]) 8/27/2010    Diabetes ([facility])     type II    Diabetic gastroparesis ([facility]) 7/7/2010    Headache(784.0) 10/7/2010    HTN (hypertension) 6/18/2010    Other ill-defined conditions(799.89)     mixed hyperlipidemia     Past Surgical History:   Procedure Laterality Date    CHOLECYSTECTOMY  1976    CORONARY STENT SINGLE W/PTCA  9/21/2010         GYN  1996    ovarian cyst removed    GYN  1978    hysterectomy    HERNIA REPAIR      LEFT HEART CATH,PERCUTANEOUS  9/21/2010         LV ANGIOGRAPHY  9/21/2010         NEUROLOGICAL SURGERY  1997    disc fusion    IA UNLISTED PROCEDURE CARDIAC SURGERY      stent     Prior Level of Function/Home Situation:   Social/Functional History  Lives With: Family (grandson and DIL)  Type of Home: Apartment  Home Layout: One level  Home Access: Level entry  Bathroom Shower/Tub: Tub/Shower unit  Bathroom Equipment: Tub transfer bench  Home Equipment: Cane, Rollator, Walker -  By Discharge: Advance to least restrictive diet without signs or symptoms of aspiration for planned discharge setting.  See evaluation for individualized goals.  Description: Swallowing goals initiated 1-4-25:  1) tolerate reg diet, mildly thick liquids without s/s aspiration by 1-7-25-met  2) tolerate reg diet, thin liquids without s/s aspiration by 1-10-25  Outcome: Adequate for Discharge

## 2025-01-09 NOTE — PROGRESS NOTES
Hospitalist Progress Note      NAME:  Amanda Barkley   :  1950  MRM:  [id number]    Date/Time: 2025  7:13 AM           Assessment / Plan:     74-year-old female is admitted with slurred speech and difficulty swallowing and left facial droop. Now pending placement to IPR    Dysarthria/left facial droop  Bell's palsy  - CT and CTA head and neck without any concerns  - Brain MRI without acute stroke  - , A1c 7.8  - Continue aspirin and statin due to prior history of strokes  - Neurology evaluated, started prednisone and valacyclovir  - Scheduled gabapentin for pain  - Zanaflex TID PRN   - PT/OT are recommending IPR once stable  - On gabapentin      Type 2 diabetes  - A1c 7.8. Home regimen of lantus 18 units nightly; Novolog 2-4 units with meals + correctional   - BG goal < 140 fasting, < 180 postprandial. Correctional insulin, glucose monitoring, Hypoglycemic protocol   - Hyperglycemia on steroids, restarted home lantus at lower dose. NPH added for steroid coverage.   - DM consult      Chronic COPD  Chronic respiratory failure  - Continue home meds  - On 2 L nasal cannula at baseline     CAD  - Continue aspirin & Lipitor       Vaginal yeast infection   - PO diflucan today. Repeat in 72 hours.   - Check UA         #BMI (Calculated): 26.19    I have personally reviewed the radiographs, laboratory data in Epic and decisions and statements above are based partially on this personal interpretation.                 Care Plan discussed with: Patient, Care Manager, and Nursing Staff    Discussed:  Care Plan and D/C Planning    Prophylaxis:  Lovenox    Disposition:  SAH/Rehab    Total time spent with patient care: 30 Minutes **I personally saw and examined the patient during this time period**           ___________________________________________________    Attending Physician: Cholo phillip MD        Subjective:     Chief Complaint:  She was having spasms in feet this AM. Also she has had vaginal yeast

## 2025-01-09 NOTE — CONSULTS
[facility]  DIABETES MANAGEMENT CONSULT    Consulted by Provider for advanced nursing evaluation and care for inpatient blood glucose management.    Evaluation and Action Plan   Amanda Barkley is a 74 y.o. female with PMHx of T2DM, previous CVA, HTN, HLD, CAD, COPD, who was brought in by family due to left facial drop and slurred speech. No acute stroke. Diagnosed with Bell's Palsy and currently being treated with steroids and valacyclovir. DM consulted for glycemic management. [facility] Diagnosed with T2DM at age 16, previously on oral agents, suffered diabetic crisis in , and has been on insulin regimen only since that time. She wears Freestyle akil 3 CGM for BG monitoring and seems to be consistent with her medication and meals. She was having issues with overnight hypoglycemia not long ago, but Lantus dose was reduced, and this has shown improvement. Her current A1c is 7.8%, which is acceptable for someone her age, but has room for improvement given her CV risks.     Admission . Was initially started on correctional insulin only. Once started on steroids, BG up in the 300s, FBG last few days averaging around 180. She is eating some, but not consistently. Will start NPH this morning to cover effects of steroid and low dose Lantus tonight to cover her diabetes metabolic needs. Will continue to monitor.    Blood glucose pattern    Significant diabetes-related events over the past 24-72 hours  A1C 7.8%  Fasting B  Pre-prandial: 204-284  Basal: 0  Bolus: 0  Correction: 7 units   Total daily insulin dose in the last 24 hours: 7 units      Management Rationale Action Plan   Medication   Basal needs Using 0.15 units/kg/D based on weight to cover her diabetes  Lantus 10 units nightly   Nutritional needs Covers carb load in meals Eating not consistent   Corrective insulin Using low dose scale based on weight     Steroid taper                     History  Acute complications  Steroid induced hyperglycemia  Neurological complications  Gastroparesis and Peripheral neuropathy  Microvascular disease    Macrovascular disease  CAD and cerebral vascular accident  Other associated conditions     Overweight, HTN, HLD    Diabetes Medication History  Diabetes drug class Diabetes drug name Additional Comments   Insulin Lantus 18 units daily  Novolog 2 units small meals                 4 units large meals  Correction 1:50 > 150      Diabetes self-management practices:   Eating pattern   [x] Eating a carbohydrate-controlled meal plan  [x] Breakfast Egg, one piece of toast, coffee   [x] Lunch  Turkey sandwich   [x] Dinner   Varies; spaghetti or meatloaf with veggies    [x] Snacks Used to have potato chips but no longer   [x] Beverages Coffee, water   Physical activity pattern   [x] Not employing a physical activity program to control BG    Monitoring pattern: Freestyle akil 3   [x] Testing BGs sufficiently to inform self-management adjustments  Average BG last 90 days: 201  TIR: 39% last 90 days  Was having frequent overnight hypoglycemia but better since Lantus dose lowered    Taking medications pattern  [x] Consistent administration  [x] Affordable  Reducing risks  [] Influenza:   09/22/2010   [] Pneumonia:     [] Hepatitis:    Social determinants of health impacting diabetes self-management practices    None determined    Overall evaluation:    [x]  Achieving A1c target with drug therapy & self-care practices    Subjective   ”I've had diabetes since I was 16 years old.”     Objective   Physical exam  General  Female with overweight in no acute distress. Conversant and cooperative  Neuro  Alert, oriented   Vital Signs   Vitals:    01/09/25 0813   BP: (!) 154/58   Pulse: 80   Resp: 18   Temp: 98.1 °F (36.7 °C)   SpO2: 97%     Extremities No foot wounds; endorsed neuropathy pain    Diabetic foot exam:    Left Foot     Visual Exam:normal   Pulse DP: Present  Right

## 2025-01-09 NOTE — PLAN OF CARE
Problem: Chronic Conditions and Co-morbidities  Goal: Patient's chronic conditions and co-morbidity symptoms are monitored and maintained or improved  1/9/2025 0342 by Cee Yeung RN  Outcome: Progressing  Flowsheets (Taken 1/8/2025 1955)  Care Plan - Patient's Chronic Conditions and Co-Morbidity Symptoms are Monitored and Maintained or Improved: Monitor and assess patient's chronic conditions and comorbid symptoms for stability, deterioration, or improvement  1/8/2025 1755 by Sara Welsh RN  Outcome: Progressing  Flowsheets (Taken 1/8/2025 0815)  Care Plan - Patient's Chronic Conditions and Co-Morbidity Symptoms are Monitored and Maintained or Improved: Monitor and assess patient's chronic conditions and comorbid symptoms for stability, deterioration, or improvement     Problem: Discharge Planning  Goal: Discharge to home or other facility with appropriate resources  1/9/2025 0342 by Cee Yeung RN  Outcome: Progressing  Flowsheets (Taken 1/8/2025 1955)  Discharge to home or other facility with appropriate resources: Identify barriers to discharge with patient and caregiver  1/8/2025 1755 by Sara Welsh RN  Outcome: Progressing  Flowsheets (Taken 1/8/2025 0815)  Discharge to home or other facility with appropriate resources:   Identify barriers to discharge with patient and caregiver   Arrange for needed discharge resources and transportation as appropriate     Problem: Safety - Adult  Goal: Free from fall injury  1/9/2025 0342 by Cee Yeung RN  Outcome: Progressing  1/8/2025 1755 by Sara Welsh RN  Outcome: Progressing     Problem: ABCDS Injury Assessment  Goal: Absence of physical injury  1/9/2025 0342 by Cee Yeung RN  Outcome: Progressing  1/8/2025 1755 by Sara Welsh RN  Outcome: Progressing     Problem: Pain  Goal: Verbalizes/displays adequate comfort level or baseline comfort level  1/9/2025 0342 by Cee Yeung RN  Outcome: Progressing  1/8/2025 1755 by Sara Welsh

## 2025-01-09 NOTE — PROGRESS NOTES
Insurance denied IPR. Peer to peer offered. Called [phone]. Left VM for return of call.     If declined, may need to pursue SNF placement.     CM following

## 2025-01-09 NOTE — PROGRESS NOTES
-NIH scale given to MD in AM. Number increased but nothing new w sxs per pt. MD stated dc.    - MD asked for UA.  Pt hasn't been able to void since order was placed.Informed Oncoming RN of  that's due.

## 2025-01-09 NOTE — CARE COORDINATION
01/09/25  2:02 PM  Insurance has denied IPR [facility] and offered a P2P and needs to be completed by Friday 1/10/25 at 1200. MD will need to call [phone]. CHARLA has notified attending of P2P at 1/9/25 5937. CM called pt's emergency contact nuno Orourke [phone]. CM has provided George with updates. George has asked CM call pt's grandson Anthony Orourke [phone] as he assists with patient's care and helps transport patient to and from appointments. Nuno Vazquez is out of town working. CM spoke to Anthony and explained P2P process and the need for SNF preferences. CM has delivered SNF list to patient and Anthony at pt's bedside. CM following for preferences. Patient will need auth for SNF if IPR is denied.             Care Management Progress Note      Reason for Admission:   Dysarthria [R47.1]  Limb tremor [R25.1]  Facial droop [R29.810]       Patient Admission Status: Inpatient  RUR: 11%  Hospitalization in the last 30 days (Readmission):  No        Transition of care plan:  Patient was discussed in IDR and continues to be medically managed. Therapy is following.     Dispo: IPR.   If  IPR:  Date FOC offered: 1/7/25.  Accepting facility: [facility].  Date authorization started with reference number: [facility] started insurance authorization on Tuesday 1/7/25.  Date authorization received and expires:     Discharge plan communicated with patient and/or discharge caregiver: Yes . CM updated patient at bedside today Thursday 1/9. CM left a HIPAA complaint voicemail for pt's emergency contact nuno Orourke [phone].    Date 1st IMM letter given: 1/8/25.    Outpatient follow-up.    Transport at discharge: stretcher. Therapy recommended patient used a nhan steady with staff assist x2 for OOB transfers.          ___________________________________________   Thao Morel RN Case Manager  1/9/2025   1:00 PM

## 2025-01-09 NOTE — PLAN OF CARE
Problem: Occupational Therapy - Adult  Goal: By Discharge: Performs self-care activities at highest level of function for planned discharge setting.  See evaluation for individualized goals.  Description: FUNCTIONAL STATUS PRIOR TO ADMISSION:  Patient is questionable historian.  She lives with grandson and DIL.  She reports mod indep with mobility at rollator level and then can when rollator will not fit in parts of apartment.  She reports having someone to assist her with ADLs but was questionable regarding recent need vs long term needs.    , Prior Level of Assist for ADLs: Needs assistance,  ,  ,  ,  ,  , Prior Level of Assist for Homemaking: Needs assistance,  ,  , Active : No     HOME SUPPORT: Patient lived family.  She reports her son wants her to move in with him.     Occupational Therapy Goals:  Initiated 1/5/2025  1.  Patient will perform grooming in unsupported sitting with Supervision within 7 day(s).  2.  Patient will perform upper body dressing with Supervision within 7 day(s).  3.  Patient will perform lower body dressing with Moderate Assist within 7 day(s).  4.  Patient will perform toilet transfers to [facility] with Moderate Assist  within 7 day(s).  5.  Patient will perform all aspects of toileting with Moderate Assist within 7 day(s).  6.  Patient will participate in upper extremity therapeutic exercise/activities with Supervision for 5 minutes within 7 day(s).    7.  Patient will participate in Fugl Vernon assessment within 7 day(s).  Outcome: Progressing    OCCUPATIONAL THERAPY TREATMENT  Patient: Amanda Barkley (74 y.o. female)  Date: 1/9/2025  Primary Diagnosis: Dysarthria [R47.1]  Limb tremor [R25.1]  Facial droop [R29.810]       Precautions: Fall Risk                Chart, occupational therapy assessment, plan of care, and goals were reviewed.    ASSESSMENT  Patient continues to benefit from skilled OT services and is progressing towards goals. She continues to be limited by decreased  Stand-by assistance  Scooting: Stand-by assistance     Transfers:   Transfer Training  Transfer Training: Yes  Sit to Stand: Minimum assistance  Stand to Sit: Minimum assistance  Bed to Chair: Minimum assistance           Balance:     Balance  Sitting: Intact  Sitting - Static: Good (unsupported)  Sitting - Dynamic: Good (unsupported)  Standing - Static: Good;Constant support  Standing - Dynamic: Constant support;Fair      ADL Intervention:    LE Dressing: Moderate assistance  LE Dressing Skilled Clinical Factors: donning brief, assist for distal threading and standing balance to pull brief over hips    Toileting: Maximum assistance  Toileting Skilled Clinical Factors: bowel hygiene in standing, assist for reach and balance    Pain Rating:  None reported      Activity Tolerance:   Fair   Please refer to the flowsheet for vital signs taken during this treatment.    After treatment:   Patient left in no apparent distress sitting up in chair, Call bell within reach, and Bed/ chair alarm activated    COMMUNICATION/EDUCATION:   The patient's plan of care was discussed with: physical therapist and registered nurse    Patient Education  Education Given To: Patient  Education Provided: Fall Prevention Strategies;Mobility Training;Transfer Training;ADL Adaptive Strategies;Role of Therapy  Education Method: Verbal  Barriers to Learning: Cognition  Education Outcome: Continued education needed;Verbalized understanding    Thank you for this referral.  Erica Arriaga OTR/L  Minutes: 17

## 2025-01-09 NOTE — PLAN OF CARE
Problem: Physical Therapy - Adult  Goal: By Discharge: Performs mobility at highest level of function for planned discharge setting.  See evaluation for individualized goals.  Description: FUNCTIONAL STATUS PRIOR TO ADMISSION: Patient was modified independent using a rollator and single point cane for functional mobility. Questionable historian.    HOME SUPPORT PRIOR TO ADMISSION: The patient lived with multiple family members that assist as needed.    Physical Therapy Goals  Initiated 1/5/2025  1.  Patient will move from supine to sit and sit to supine, scoot up and down, and roll side to side in bed with modified independence within 7 day(s).    2.  Patient will perform sit to stand with minimal assistance within 7 day(s).  3.  Patient will transfer from bed to chair and chair to bed with minimal assistance using the least restrictive device within 7 day(s).  4.  Patient will ambulate with minimal assistance for 10 feet with the least restrictive device within 7 day(s).   5.  Will assess stair goal when more appropriate.    Outcome: Progressing   PHYSICAL THERAPY TREATMENT    Patient: Amanda Barkley (74 y.o. female)  Date: 1/9/2025  Diagnosis: Dysarthria [R47.1]  Limb tremor [R25.1]  Facial droop [R29.810] Dysarthria      Precautions: Fall Risk                      ASSESSMENT:  Patient continues to benefit from skilled PT services and is progressing towards goals. Patient has made significant improvement today.  Able to come to the EOB with supervision.  In sitting on EOB patient requires Ximena to come to stand. Able to amb approx 35 feet with RW and Ximena.  Patient without any ataxia or LE buckling this session.  Continues to be a high fall risk and is below her baseline.          PLAN:  Patient continues to benefit from skilled intervention to address the above impairments.  Continue treatment per established plan of care.        Recommendation for discharge: (in order for the patient to meet his/her long  patient's plan of care was discussed with: physical therapist, occupational therapist, and registered nurse           Saba Wright, PT, DPT  Minutes: 24

## 2025-01-10 VITALS
BODY MASS INDEX: 26.07 KG/M2 | OXYGEN SATURATION: 95 % | HEIGHT: 66 IN | WEIGHT: 162.2 LBS | SYSTOLIC BLOOD PRESSURE: 145 MMHG | HEART RATE: 78 BPM | DIASTOLIC BLOOD PRESSURE: 60 MMHG | TEMPERATURE: 98.1 F | RESPIRATION RATE: 17 BRPM

## 2025-01-10 LAB
GLUCOSE BLD STRIP.AUTO-MCNC: 138 MG/DL (ref 65–117)
GLUCOSE BLD STRIP.AUTO-MCNC: 215 MG/DL (ref 65–117)
GLUCOSE BLD STRIP.AUTO-MCNC: 284 MG/DL (ref 65–117)
GLUCOSE BLD STRIP.AUTO-MCNC: 393 MG/DL (ref 65–117)
SERVICE CMNT-IMP: ABNORMAL

## 2025-01-10 PROCEDURE — 2500000003 HC RX 250 WO HCPCS: Performed by: INTERNAL MEDICINE

## 2025-01-10 PROCEDURE — 6370000000 HC RX 637 (ALT 250 FOR IP): Performed by: STUDENT IN AN ORGANIZED HEALTH CARE EDUCATION/TRAINING PROGRAM

## 2025-01-10 PROCEDURE — 6370000000 HC RX 637 (ALT 250 FOR IP)

## 2025-01-10 PROCEDURE — 97112 NEUROMUSCULAR REEDUCATION: CPT | Performed by: OCCUPATIONAL THERAPIST

## 2025-01-10 PROCEDURE — 6370000000 HC RX 637 (ALT 250 FOR IP): Performed by: FAMILY MEDICINE

## 2025-01-10 PROCEDURE — 6370000000 HC RX 637 (ALT 250 FOR IP): Performed by: INTERNAL MEDICINE

## 2025-01-10 PROCEDURE — 97116 GAIT TRAINING THERAPY: CPT

## 2025-01-10 PROCEDURE — 97535 SELF CARE MNGMENT TRAINING: CPT | Performed by: OCCUPATIONAL THERAPIST

## 2025-01-10 PROCEDURE — 6360000002 HC RX W HCPCS: Performed by: STUDENT IN AN ORGANIZED HEALTH CARE EDUCATION/TRAINING PROGRAM

## 2025-01-10 PROCEDURE — 6370000000 HC RX 637 (ALT 250 FOR IP): Performed by: PSYCHIATRY & NEUROLOGY

## 2025-01-10 PROCEDURE — 82962 GLUCOSE BLOOD TEST: CPT

## 2025-01-10 PROCEDURE — 97530 THERAPEUTIC ACTIVITIES: CPT

## 2025-01-10 PROCEDURE — 94761 N-INVAS EAR/PLS OXIMETRY MLT: CPT

## 2025-01-10 RX ORDER — INSULIN GLARGINE 100 [IU]/ML
18 INJECTION, SOLUTION SUBCUTANEOUS NIGHTLY
Qty: 10 ML | Refills: 3 | Status: SHIPPED | OUTPATIENT
Start: 2025-01-10

## 2025-01-10 RX ORDER — INSULIN LISPRO 100 [IU]/ML
4 INJECTION, SOLUTION INTRAVENOUS; SUBCUTANEOUS
Qty: 3 ML | Refills: 0 | Status: SHIPPED | OUTPATIENT
Start: 2025-01-10 | End: 2025-01-10 | Stop reason: HOSPADM

## 2025-01-10 RX ORDER — PREDNISONE 20 MG/1
TABLET ORAL
Qty: 8 TABLET | Refills: 0 | Status: SHIPPED | OUTPATIENT
Start: 2025-01-11 | End: 2025-01-20

## 2025-01-10 RX ORDER — ATORVASTATIN CALCIUM 80 MG/1
80 TABLET, FILM COATED ORAL NIGHTLY
Qty: 30 TABLET | Refills: 3 | Status: SHIPPED | OUTPATIENT
Start: 2025-01-10

## 2025-01-10 RX ORDER — GABAPENTIN 100 MG/1
100 CAPSULE ORAL 3 TIMES DAILY
Qty: 90 CAPSULE | Refills: 0 | Status: SHIPPED | OUTPATIENT
Start: 2025-01-10 | End: 2025-02-09

## 2025-01-10 RX ORDER — INSULIN ASPART 100 [IU]/ML
0-4 INJECTION, SOLUTION INTRAVENOUS; SUBCUTANEOUS
Qty: 5 ADJUSTABLE DOSE PRE-FILLED PEN SYRINGE | Refills: 3 | Status: SHIPPED | OUTPATIENT
Start: 2025-01-10

## 2025-01-10 RX ORDER — FLUCONAZOLE 150 MG/1
150 TABLET ORAL
Qty: 1 TABLET | Refills: 0 | Status: SHIPPED | OUTPATIENT
Start: 2025-01-12 | End: 2025-01-13

## 2025-01-10 RX ORDER — INSULIN LISPRO 100 [IU]/ML
7 INJECTION, SOLUTION INTRAVENOUS; SUBCUTANEOUS ONCE
Status: COMPLETED | OUTPATIENT
Start: 2025-01-10 | End: 2025-01-10

## 2025-01-10 RX ORDER — VALACYCLOVIR HYDROCHLORIDE 1 G/1
1000 TABLET, FILM COATED ORAL 3 TIMES DAILY
Qty: 5 TABLET | Refills: 0 | Status: SHIPPED | OUTPATIENT
Start: 2025-01-10 | End: 2025-01-12

## 2025-01-10 RX ORDER — ASPIRIN 81 MG/1
81 TABLET, CHEWABLE ORAL DAILY
Qty: 30 TABLET | Refills: 3 | Status: SHIPPED | OUTPATIENT
Start: 2025-01-11

## 2025-01-10 RX ORDER — INSULIN ASPART 100 [IU]/ML
4 INJECTION, SOLUTION INTRAVENOUS; SUBCUTANEOUS
Qty: 5 ADJUSTABLE DOSE PRE-FILLED PEN SYRINGE | Refills: 0 | Status: SHIPPED | OUTPATIENT
Start: 2025-01-10

## 2025-01-10 RX ADMIN — VALACYCLOVIR HYDROCHLORIDE 1000 MG: 500 TABLET, FILM COATED ORAL at 09:36

## 2025-01-10 RX ADMIN — INSULIN LISPRO 1 UNITS: 100 INJECTION, SOLUTION INTRAVENOUS; SUBCUTANEOUS at 12:04

## 2025-01-10 RX ADMIN — ENOXAPARIN SODIUM 40 MG: 100 INJECTION SUBCUTANEOUS at 09:35

## 2025-01-10 RX ADMIN — PREDNISONE 50 MG: 5 TABLET ORAL at 09:35

## 2025-01-10 RX ADMIN — INSULIN HUMAN 18 UNITS: 100 INJECTION, SUSPENSION SUBCUTANEOUS at 09:34

## 2025-01-10 RX ADMIN — ASPIRIN 81 MG: 81 TABLET, CHEWABLE ORAL at 09:36

## 2025-01-10 RX ADMIN — VALACYCLOVIR HYDROCHLORIDE 1000 MG: 500 TABLET, FILM COATED ORAL at 13:46

## 2025-01-10 RX ADMIN — GABAPENTIN 100 MG: 100 CAPSULE ORAL at 13:46

## 2025-01-10 RX ADMIN — GABAPENTIN 100 MG: 100 CAPSULE ORAL at 09:36

## 2025-01-10 RX ADMIN — TIZANIDINE 2 MG: 2 TABLET ORAL at 07:22

## 2025-01-10 RX ADMIN — SODIUM CHLORIDE, PRESERVATIVE FREE 5 ML: 5 INJECTION INTRAVENOUS at 09:37

## 2025-01-10 RX ADMIN — INSULIN LISPRO 7 UNITS: 100 INJECTION, SOLUTION INTRAVENOUS; SUBCUTANEOUS at 17:16

## 2025-01-10 ASSESSMENT — PAIN SCALES - GENERAL
PAINLEVEL_OUTOF10: 3
PAINLEVEL_OUTOF10: 9

## 2025-01-10 ASSESSMENT — PAIN DESCRIPTION - DESCRIPTORS: DESCRIPTORS: SPASM

## 2025-01-10 ASSESSMENT — PAIN DESCRIPTION - ORIENTATION: ORIENTATION: LEFT;RIGHT

## 2025-01-10 ASSESSMENT — PAIN - FUNCTIONAL ASSESSMENT: PAIN_FUNCTIONAL_ASSESSMENT: PREVENTS OR INTERFERES SOME ACTIVE ACTIVITIES AND ADLS

## 2025-01-10 ASSESSMENT — PAIN DESCRIPTION - LOCATION: LOCATION: LEG

## 2025-01-10 NOTE — DISCHARGE INSTRUCTIONS
HOSPITALIST DISCHARGE INSTRUCTIONS  NAME:  Amanda Barkley   :  1950   MRN:  [id number]     Date/Time:  1/10/2025 4:25 PM    ADMIT DATE: 1/3/2025     DISCHARGE DATE: 1/10/2025     DISCHARGE DIAGNOSIS:  [place] Palsy     DISCHARGE INSTRUCTIONS:  Thank you for allowing us to participate in your care. Your discharging Hospitalist is Cholo phillip MD. You were admitted for evaluation and treatment of the above.     The CT scans and MRIs of your brain with no new stroke or abnormal findings. You were seen by neurology and the symptoms were attributed to [place] Palsy. Please continue prednisone taper and valacyclovir. You can take gabapentin for the pain (do not take with lyrica), it can make you sleepy. Also take zanaflex as needed for spasms. I have provided a referral to neurology for follow up in the next 4-6 weeks. Please follow up with your primary care doctor in the next 7 - 10 days.     Given your history of stroke, please take aspirin & statin daily to prevent any other strokes.     For the yeast infection, you were given diflucan. You can repeat another dose in 72 hours if you still have the symptoms.     Please resume your prior diabetes regimen with lantus 18 units nightly + lispro 2-4 units with meals + sliding scale with meals and follow up with your pcp     Home Blood Sugar Monitoring  - Monitor you blood sugar at home 4 times per day (before each meal and before bed). Write down the results and bring the blood sugar diary to every visit with your primary care doctor.     - Blood sugar goals:   Premeal:  mg/dl  Postmeal (2 hours after first bite): Less than 180 mg/dl      Hypoglycemia Precautions    - Monitor for symptoms of low blood sugar. These include feeling shaky, sweaty, confused, light headed, sleepy, weak, or just not like yourself. If you develop these symptoms, check your blood sugar right away.     - If you blood sugar is < 70, it is too low. Remember the 15-15 rule if you have

## 2025-01-10 NOTE — PLAN OF CARE
Problem: Occupational Therapy - Adult  Goal: By Discharge: Performs self-care activities at highest level of function for planned discharge setting.  See evaluation for individualized goals.  Description: FUNCTIONAL STATUS PRIOR TO ADMISSION:  Patient is questionable historian.  She lives with grandson and DIL.  She reports mod indep with mobility at rollator level and then can when rollator will not fit in parts of apartment.  She reports having someone to assist her with ADLs but was questionable regarding recent need vs long term needs.   Prior Level of Assist for ADLs: Needs assistance, Prior Level of Assist for Homemaking: Needs assistance, Active : No     HOME SUPPORT: Patient lived family.  She reports her son wants her to move in with him.     Occupational Therapy Goals:  Initiated 1/5/2025  1.  Patient will perform grooming in unsupported sitting with Supervision within 7 day(s). MET 1/10/25 and upgrad to SBA in standing at sink.  2.  Patient will perform upper body dressing with Supervision within 7 day(s).  3.  Patient will perform lower body dressing with Moderate Assist within 7 day(s).  4.  Patient will perform toilet transfers to BSC with Moderate Assist  within 7 day(s). MET 1/10/25 and upgrade to CGA.  5.  Patient will perform all aspects of toileting with Moderate Assist within 7 day(s).  6.  Patient will participate in upper extremity therapeutic exercise/activities with Supervision for 5 minutes within 7 day(s).    7.  Patient will participate in Fugl Vernon assessment within 7 day(s).- MET 1/10/25  Outcome: Progressing     OCCUPATIONAL THERAPY TREATMENT  Patient: Amanda Barkley (74 y.o. female)  Date: 1/10/2025  Primary Diagnosis: Dysarthria [R47.1]  Limb tremor [R25.1]  Facial droop [R29.810]       Precautions: Fall Risk                Chart, occupational therapy assessment, plan of care, and goals were reviewed.    ASSESSMENT  Patient continues to benefit from skilled OT services  Partial  Repeated Dorsiflexion/ Volar Flexion: Full  Circumduction: Full  Wrist Total: 8    Hand  Mass Flexion: Full  Mass Extension: Full  Grasp A: Partial  Grasp B: Partial  Grasp C: Partial  Grasp D: Partial  Grasp E: Partial  Hand Total: 9    Coordination/Speed  Tremor: Slight  Dysmetria: Slight  Time: 2-5s (RUE 7 sec and LUE 5 sec)  Coordination/Speed Total: 3    Total A-D  Total A-D (Motor Function): 55         This is a reliable/valid measure of arm function after a neurological event. It has established value to characterize functional status and for measuring spontaneous and therapy-induced recovery; tests proximal and distal motor functions. Fugl-Vernon Assessment - UE scores recorded between five and 30 days post neurologic event can be used to predict UE recovery at six months post neurologic event.  Severe = 0-21 points   Moderately Severe = 22-33 points   Moderate = 34-47 points   Mild = 48-66 points  FLORIN Luong, ALICIA Mena, MARY Sheriff, ANDREZ Green, & NICOLA Stacy (1992). Measurement of motor recovery after stroke: Outcome assessment and sample size requirements. Stroke, 23, pp. 9049-2101.   --------------------------------------------------------------------------------------------------------------------------------------------------------------------  MCID:  Stroke:   (Alok et al, 2001; n = 171; mean age 70 (11) years; assessed within 17 (12) days of stroke, Acute Stroke)  FMA Motor Scores from Admission to Discharge   10 point increase in FMA Upper Extremity = 1.5 change in discharge FIM   10 point increase in FMA Lower Extremity = 1.9 change in discharge FIM  MDC:   Stroke:   (Ilir et al, 2008, n = 14, mean age = 59.9 (14.6) years, assessed on average 14 (6.5) months post stroke, Chronic Stroke)   FMA = 5.2 points for the Upper Extremity portion of the assessment

## 2025-01-10 NOTE — PLAN OF CARE
Problem: Physical Therapy - Adult  Goal: By Discharge: Performs mobility at highest level of function for planned discharge setting.  See evaluation for individualized goals.  Description: FUNCTIONAL STATUS PRIOR TO ADMISSION: Patient was modified independent using a rollator and single point cane for functional mobility. Questionable historian.    HOME SUPPORT PRIOR TO ADMISSION: The patient lived with multiple family members that assist as needed.    Physical Therapy Goals  Initiated 1/5/2025  1.  Patient will move from supine to sit and sit to supine, scoot up and down, and roll side to side in bed with modified independence within 7 day(s).    2.  Patient will perform sit to stand with minimal assistance within 7 day(s).  3.  Patient will transfer from bed to chair and chair to bed with minimal assistance using the least restrictive device within 7 day(s).  4.  Patient will ambulate with minimal assistance for 10 feet with the least restrictive device within 7 day(s).   5.  Will assess stair goal when more appropriate.    Outcome: Progressing   PHYSICAL THERAPY TREATMENT    Patient: Amanda Barkley (74 y.o. female)  Date: 1/10/2025  Diagnosis: Dysarthria [R47.1]  Limb tremor [R25.1]  Facial droop [R29.810] Dysarthria      Precautions: Fall Risk                      ASSESSMENT:  Patient continues to benefit from skilled PT services and is progressing towards goals. Patient reports that at home she only use oxygen as needed.  Patient performed transfer training for supine to sit and sit <>stand from the bed, toilet, and chair with good use of Upper extremity support.  Performed gait training with the rolling walker and gait belt in the room, bathroom, and hallway.  Patient able to perform self cares after using the toilet and was able to stand at the sink to wash her hands with CG x 1.           PLAN:  Patient continues to benefit from skilled intervention to address the above impairments.  Continue

## 2025-01-10 NOTE — DISCHARGE SUMMARY
Hospitalist Discharge Summary     Patient ID:  Amanda Barkley  [id number]  74 y.o.  1950    Admit date: 1/3/2025    Discharge date and time: 1/10/2025    Admission Diagnoses: Dysarthria [R47.1]  Limb tremor [R25.1]  Facial droop [R29.810]    Discharge Diagnoses:    Principal Problem:    Dysarthria  Active Problems:    DM (diabetes mellitus) (HCC)    HTN (hypertension)    COPD (chronic obstructive pulmonary disease) (HCC)    Hypercholesterolemia    CAD (coronary artery disease)    Facial droop    Steroid-induced hyperglycemia  Resolved Problems:    * No resolved hospital problems. *         Hospital Course:       74-year-old female is admitted with slurred speech and difficulty swallowing and left facial droop.      Dysarthria/left facial droop  Bell's palsy  - CT and CTA head and neck without any concerns. Brain MRI without acute stroke. , A1c 7.8. Continue aspirin and statin due to prior history of strokes  - Neurology evaluated. Diagnosed with bells palsy. DC on prednisone taper and valacyclovir. Symptoms resolved prior to DC  - Scheduled gabapentin for pain  - Zanaflex TID PRN   - PT/OT are recommending IPR. Insurance declined. Has full time caregiver at home. Family opted for home with HH     Type 2 diabetes  - A1c 7.8.   - Continue home regimen of lantus 18 units nightly; Novolog 4 units with meals + correctional      Chronic COPD  Chronic respiratory failure  - Continue home meds  - On 2 L nasal cannula at baseline     CAD  - Continue aspirin & Lipitor      Vaginal yeast infection   - S/p diflucan x1. Repeat in 72 hours if still symptomatic   - UA with yeast, likely contaminate from vaginal infection  - Will follow final culture results   - Symptomatically improved after dilfucan x 1        Imaging  FL MODIFIED BARIUM SWALLOW W VIDEO    Result Date: 1/6/2025  See above for details. Fluoro time: 1.1 minutes. Fluoro dose (Air Kerma): 9.327 mGy. Electronically signed by Darell Chaidez    MRI

## 2025-01-10 NOTE — PROGRESS NOTES
Comprehensive Nutrition Assessment    Type and Reason for Visit:  [facility]    Nutrition Recommendations/Plan:   Continue current diet; Regular; 4 carb choices (60 gm/meal)    Consider repleting potassium      Malnutrition Assessment:  Malnutrition Status:  Mild malnutrition (01/10/25 1014)    Context:  Acute Illness     Findings of the 6 clinical characteristics of malnutrition:  Energy Intake:  No decrease in energy intake  Weight Loss:  Greater than 2% over 1 week     Body Fat Loss:  No body fat loss     Muscle Mass Loss:  No muscle mass loss    Fluid Accumulation:  No fluid accumulation     Strength:  Not Performed    Nutrition Assessment:     Patient is a 74 year old female admitted with Dysarthria [R47.1]  Limb tremor [R25.1]  Facial droop [R29.810]  And  has a past medical history of CAD (coronary artery disease), Chronic pain, COPD (chronic obstructive pulmonary disease) (HCC), Diabetes (HCC), Diabetic gastroparesis (HCC), Headache(784.0), HTN (hypertension), and Other ill-defined conditions(799.89).    Patient described diet and appetite as variable, which aligns with documented PO intake. This is usual for her as she's never been \"a big eater\". Patient reports UBW of 130 lbs but unsure of the last time she was at this weight. Recently, she states she has been around 160 lbs. Admission weight 162 lbs. Bed scale shows weight of 155 lbs. Weight loss of 7 lbs (4.4%) significant for time frame, one week. NFPE performed with findings in malnutrition assessment. BG well managed in hosptial, patient reports home glucose sometimes in the 400s. Alternating constipation and diarrhea, which patient says has been normal for her for the past 15 years. Recommended glucerna to support caloric needs given variable intake, but patient refused. Says she's tried it in the past and she can't keep it down. Recommendation to continue current diet and consider repleting potassium.           Nutrition Related Findings:      Meal  Biochemical Data    Discharge Planning:    Continue current diet     Noble Jack, Dietetic Intern   RD Office, ext: [phone]

## 2025-01-10 NOTE — CARE COORDINATION
01/10/25  4:48 PM  [facility] has accepted and will reach out to pt's family for start of care date. HH contact information has beee placed on pt's chart.           01/10/25  4:41 PM  CHARLA received a call  from [facility] medicare Arsenio, insurance auth has been approved for [facility] ref# [id number]. CM called to [facility] SNF admissions bed is available for patient to admit today. CM spoke with patient and grandson Anthony at bedside. Patient would like to discharge home with [facility]. CM has notified pt's attending. CM has sent HH referral to [facility] in [employer], acceptance pending. CM has called and notified SNF to cancel referral.           01/10/25  3:33 PM  [facility] acceptance remains pending. [facility] is out of network. [facility] has not responded to referral. [facility] has declined. [facility] is not in network. CM relayed information to patient and grandson at bedside. Grandson has asked if patient could discharge home instead of waiting for SNF and insurance authorization. Patient lives with her daughter and grandson, grandson is her paid caregiver. Family is able to assist with all ADL's and care. Patient has a rollator, cane, RW, toilet raiser with bars,and shower chair at home. Patient has used [facility] in the past and would be the preference again if needed. CM has relayed family's question to the attending and physical therapist.               Care Management Progress Note        Reason for Admission:   Dysarthria [R47.1]  Limb tremor [R25.1]  Facial droop [R29.810]         Patient Admission Status: Inpatient  RUR: 8%  Hospitalization in the last 30 days (Readmission):  No        Transition of care plan:  Patient was discussed in IDR and continues to be medically managed. Therapy is following.     Insurance has denied IPR. MD completed Peer to Peer with insurance which was also denied.     Dispo: SNF. Referrals were sent in  [employer] & Epic. Acceptances remain pending.     If SNF :  Date FOC offered: 1/10/25.  Preferences were received in the following order. 1.[facility] [place].  2. [facility].  3. [facility].  4. [facility].  5. [facility].   Accepting facility:   Date authorization started with reference number: Patient will need insurance authorization.   Date authorization received and expires:     Discharge plan communicated with patient and/or discharge caregiver: Yes .     Date 1st IMM letter given: 1/8/25.    Outpatient follow-up.    Transport at discharge: family.        ___________________________________________   Thao Morel RN Case Manager  1/10/2025   2:25 PM

## 2025-01-10 NOTE — PROGRESS NOTES
Hospitalist Progress Note      NAME:  Amanda Barkley   :  1950  MRM:  [id number]    Date/Time: 1/10/2025  7:12 AM           Assessment / Plan:     74-year-old female is admitted with slurred speech and difficulty swallowing and left facial droop. Now pending placement to SNF    Dysarthria/left facial droop  Bell's palsy  - CT and CTA head and neck without any concerns  - Brain MRI without acute stroke  - , A1c 7.8  - Continue aspirin and statin due to prior history of strokes  - Neurology evaluated, on prednisone taper and valacyclovir  - Scheduled gabapentin for pain  - Zanaflex TID PRN   - PT/OT are recommending IPR. Insurance declined. CM working on placement to SNF      Type 2 diabetes  - A1c 7.8. Home regimen of lantus 18 units nightly; Novolog 2-4 units with meals + correctional   - BG goal < 140 fasting, < 180 postprandial. Correctional insulin, glucose monitoring, Hypoglycemic protocol   - Hyperglycemia on steroids, restarted home lantus at lower dose. NPH added for steroid coverage. Titrate with steroids   - DM consult      Chronic COPD  Chronic respiratory failure  - Continue home meds  - On 2 L nasal cannula at baseline     CAD  - Continue aspirin & Lipitor       Vaginal yeast infection   - PO diflucan today. Repeat in 72 hours.   - UA with yeast, likely contaminate from vaginal infection  - Follow urine culture   - Improving          #BMI (Calculated): 26.19    I have personally reviewed the radiographs, laboratory data in Epic and decisions and statements above are based partially on this personal interpretation.                 Care Plan discussed with: Patient, Care Manager, and Nursing Staff    Discussed:  Care Plan and D/C Planning    Prophylaxis:  Lovenox    Disposition:  [facility]/Rehab    Total time spent with patient care: 30 Minutes **I personally saw and examined the patient during this time period**           ___________________________________________________    Attending  dextrose 10 % infusion   IntraVENous Continuous PRN    insulin NPH (HumuLIN N;NovoLIN N) injection vial 20 Units  20 Units SubCUTAneous QAM    tiZANidine (ZANAFLEX) tablet 2 mg  2 mg Oral Q8H PRN    insulin glargine (LANTUS) injection vial 10 Units  10 Units SubCUTAneous Nightly    fluconazole (DIFLUCAN) tablet 150 mg  150 mg Oral Q72H    ipratropium 0.5 mg-albuterol 2.5 mg (DUONEB) nebulizer solution 1 Dose  1 Dose Inhalation Q4H PRN    gabapentin (NEURONTIN) capsule 100 mg  100 mg Oral TID    predniSONE (DELTASONE) tablet 50 mg  50 mg Oral QAM    Followed by    [START ON 1/12/2025] predniSONE (DELTASONE) tablet 40 mg  40 mg Oral QAM    Followed by    [START ON 1/14/2025] predniSONE (DELTASONE) tablet 20 mg  20 mg Oral QAM    valACYclovir (VALTREX) tablet 1,000 mg  1,000 mg Oral TID    acetaminophen (TYLENOL) tablet 650 mg  650 mg Oral Q4H PRN    sodium chloride flush 0.9 % injection 5-40 mL  5-40 mL IntraVENous 2 times per day    sodium chloride flush 0.9 % injection 5-40 mL  5-40 mL IntraVENous PRN    0.9 % sodium chloride infusion   IntraVENous PRN    ondansetron (ZOFRAN-ODT) disintegrating tablet 4 mg  4 mg Oral Q8H PRN    Or    ondansetron (ZOFRAN) injection 4 mg  4 mg IntraVENous Q6H PRN    polyethylene glycol (GLYCOLAX) packet 17 g  17 g Oral Daily PRN    enoxaparin (LOVENOX) injection 40 mg  40 mg SubCUTAneous Daily    atorvastatin (LIPITOR) tablet 80 mg  80 mg Oral Nightly    insulin lispro (HUMALOG,ADMELOG) injection vial 0-4 Units  0-4 Units SubCUTAneous 4x Daily AC & HS    hydrALAZINE (APRESOLINE) injection 10 mg  10 mg IntraVENous Q6H PRN    aspirin chewable tablet 81 mg  81 mg Oral Daily            Lab Review:     No results for input(s): \"WBC\", \"HGB\", \"HCT\", \"PLT\" in the last 72 hours.  No results for input(s): \"NA\", \"K\", \"CL\", \"CO2\", \"GLU\", \"BUN\", \"MG\", \"PHOS\", \"ALT\", \"INR\" in the last 72 hours.    Invalid input(s): \"CREA\", \"CA\", \"ALB\", \"TBIL\", \"SGOT\"  No components found for: \"GLPOC\"

## 2025-01-10 NOTE — PLAN OF CARE
Problem: Chronic Conditions and Co-morbidities  Goal: Patient's chronic conditions and co-morbidity symptoms are monitored and maintained or improved  Outcome: Progressing  Flowsheets (Taken 1/9/2025 2045)  Care Plan - Patient's Chronic Conditions and Co-Morbidity Symptoms are Monitored and Maintained or Improved: Monitor and assess patient's chronic conditions and comorbid symptoms for stability, deterioration, or improvement     Problem: Discharge Planning  Goal: Discharge to home or other facility with appropriate resources  Outcome: Progressing  Flowsheets (Taken 1/9/2025 2045)  Discharge to home or other facility with appropriate resources: Identify barriers to discharge with patient and caregiver     Problem: Safety - Adult  Goal: Free from fall injury  Outcome: Progressing     Problem: ABCDS Injury Assessment  Goal: Absence of physical injury  Outcome: Progressing     Problem: Pain  Goal: Verbalizes/displays adequate comfort level or baseline comfort level  Outcome: Progressing     Problem: Skin/Tissue Integrity  Goal: Absence of new skin breakdown  Description: 1.  Monitor for areas of redness and/or skin breakdown  2.  Assess vascular access sites hourly  3.  Every 4-6 hours minimum:  Change oxygen saturation probe site  4.  Every 4-6 hours:  If on nasal continuous positive airway pressure, respiratory therapy assess nares and determine need for appliance change or resting period.  Outcome: Progressing     Problem: Occupational Therapy - Adult  Goal: By Discharge: Performs self-care activities at highest level of function for planned discharge setting.  See evaluation for individualized goals.  Description: FUNCTIONAL STATUS PRIOR TO ADMISSION:  Patient is questionable historian.  She lives with grandson and DIL.  She reports mod indep with mobility at rollator level and then can when rollator will not fit in parts of apartment.  She reports having someone to assist her with ADLs but was questionable  minimal assistance for 10 feet with the least restrictive device within 7 day(s).   5.  Will assess stair goal when more appropriate.    1/9/2025 1547 by Saba Wright, PT  Outcome: Progressing

## 2025-01-11 LAB
BACTERIA SPEC CULT: NORMAL
CC UR VC: NORMAL
COMMENT:: NORMAL
SERVICE CMNT-IMP: NORMAL
SPECIMEN HOLD: NORMAL

## 2025-01-21 ENCOUNTER — HOSPITAL ENCOUNTER (EMERGENCY)
Facility: HOSPITAL | Age: 75
Discharge: HOME | End: 2025-01-21
Attending: EMERGENCY MEDICINE
Payer: MEDICARE

## 2025-01-21 ENCOUNTER — APPOINTMENT (OUTPATIENT)
Facility: HOSPITAL | Age: 75
End: 2025-01-21
Payer: MEDICARE

## 2025-01-21 VITALS
SYSTOLIC BLOOD PRESSURE: 178 MMHG | HEIGHT: 66 IN | OXYGEN SATURATION: 97 % | HEART RATE: 86 BPM | TEMPERATURE: 98.7 F | DIASTOLIC BLOOD PRESSURE: 62 MMHG | WEIGHT: 190 LBS | BODY MASS INDEX: 30.53 KG/M2 | RESPIRATION RATE: 18 BRPM

## 2025-01-21 DIAGNOSIS — U07.1 COVID: Primary | ICD-10-CM

## 2025-01-21 LAB
ALBUMIN SERPL-MCNC: 3.1 G/DL (ref 3.5–5)
ALBUMIN/GLOB SERPL: 0.7 (ref 1.1–2.2)
ALP SERPL-CCNC: 125 U/L (ref 45–117)
ALT SERPL-CCNC: 31 U/L (ref 12–78)
AMPHET UR QL SCN: NEGATIVE
ANION GAP SERPL CALC-SCNC: 11 MMOL/L (ref 2–12)
APPEARANCE UR: CLEAR
AST SERPL-CCNC: 37 U/L (ref 15–37)
BACTERIA URNS QL MICRO: NEGATIVE /HPF
BARBITURATES UR QL SCN: NEGATIVE
BENZODIAZ UR QL: NEGATIVE
BILIRUB SERPL-MCNC: 0.7 MG/DL (ref 0.2–1)
BILIRUB UR QL: NEGATIVE
BUN SERPL-MCNC: 14 MG/DL (ref 6–20)
BUN/CREAT SERPL: 18 (ref 12–20)
CALCIUM SERPL-MCNC: 9.2 MG/DL (ref 8.5–10.1)
CANNABINOIDS UR QL SCN: NEGATIVE
CHLORIDE SERPL-SCNC: 102 MMOL/L (ref 97–108)
CO2 SERPL-SCNC: 24 MMOL/L (ref 21–32)
COCAINE UR QL SCN: NEGATIVE
COLOR UR: ABNORMAL
COMMENT:: NORMAL
CREAT SERPL-MCNC: 0.79 MG/DL (ref 0.55–1.02)
EPITH CASTS URNS QL MICRO: ABNORMAL /LPF
ERYTHROCYTE [DISTWIDTH] IN BLOOD BY AUTOMATED COUNT: 14 % (ref 11.5–14.5)
ETHANOL SERPL-MCNC: <10 MG/DL (ref 0–0.08)
FLUAV RNA SPEC QL NAA+PROBE: NOT DETECTED
FLUBV RNA SPEC QL NAA+PROBE: NOT DETECTED
GLOBULIN SER CALC-MCNC: 4.5 G/DL (ref 2–4)
GLUCOSE SERPL-MCNC: 85 MG/DL (ref 65–100)
GLUCOSE UR STRIP.AUTO-MCNC: NEGATIVE MG/DL
HCT VFR BLD AUTO: 39.1 % (ref 35–47)
HGB BLD-MCNC: 13.1 G/DL (ref 11.5–16)
HGB UR QL STRIP: NEGATIVE
HYALINE CASTS URNS QL MICRO: ABNORMAL /LPF (ref 0–2)
KETONES UR QL STRIP.AUTO: 40 MG/DL
LEUKOCYTE ESTERASE UR QL STRIP.AUTO: NEGATIVE
Lab: NORMAL
MCH RBC QN AUTO: 28.1 PG (ref 26–34)
MCHC RBC AUTO-ENTMCNC: 33.5 G/DL (ref 30–36.5)
MCV RBC AUTO: 83.7 FL (ref 80–99)
METHADONE UR QL: NEGATIVE
NITRITE UR QL STRIP.AUTO: NEGATIVE
NRBC # BLD: 0 K/UL (ref 0–0.01)
NRBC BLD-RTO: 0 PER 100 WBC
OPIATES UR QL: NEGATIVE
PCP UR QL: NEGATIVE
PH UR STRIP: 5.5 (ref 5–8)
PLATELET # BLD AUTO: 121 K/UL (ref 150–400)
PMV BLD AUTO: 12.3 FL (ref 8.9–12.9)
POTASSIUM SERPL-SCNC: 3.9 MMOL/L (ref 3.5–5.1)
PROT SERPL-MCNC: 7.6 G/DL (ref 6.4–8.2)
PROT UR STRIP-MCNC: 300 MG/DL
RBC # BLD AUTO: 4.67 M/UL (ref 3.8–5.2)
RBC #/AREA URNS HPF: ABNORMAL /HPF (ref 0–5)
SARS-COV-2 RNA RESP QL NAA+PROBE: DETECTED
SODIUM SERPL-SCNC: 137 MMOL/L (ref 136–145)
SOURCE: ABNORMAL
SP GR UR REFRACTOMETRY: 1.02 (ref 1–1.03)
SPECIMEN HOLD: NORMAL
SPECIMEN HOLD: NORMAL
UROBILINOGEN UR QL STRIP.AUTO: 1 EU/DL (ref 0.2–1)
WBC # BLD AUTO: 6.6 K/UL (ref 3.6–11)
WBC URNS QL MICRO: ABNORMAL /HPF (ref 0–4)

## 2025-01-21 PROCEDURE — 93005 ELECTROCARDIOGRAM TRACING: CPT | Performed by: STUDENT IN AN ORGANIZED HEALTH CARE EDUCATION/TRAINING PROGRAM

## 2025-01-21 PROCEDURE — 87636 SARSCOV2 & INF A&B AMP PRB: CPT

## 2025-01-21 PROCEDURE — 71046 X-RAY EXAM CHEST 2 VIEWS: CPT

## 2025-01-21 PROCEDURE — 85027 COMPLETE CBC AUTOMATED: CPT

## 2025-01-21 PROCEDURE — 80053 COMPREHEN METABOLIC PANEL: CPT

## 2025-01-21 PROCEDURE — 82077 ASSAY SPEC XCP UR&BREATH IA: CPT

## 2025-01-21 PROCEDURE — 36415 COLL VENOUS BLD VENIPUNCTURE: CPT

## 2025-01-21 PROCEDURE — 81001 URINALYSIS AUTO W/SCOPE: CPT

## 2025-01-21 PROCEDURE — 80307 DRUG TEST PRSMV CHEM ANLYZR: CPT

## 2025-01-21 PROCEDURE — 99285 EMERGENCY DEPT VISIT HI MDM: CPT

## 2025-01-21 PROCEDURE — 74177 CT ABD & PELVIS W/CONTRAST: CPT

## 2025-01-21 PROCEDURE — 6360000004 HC RX CONTRAST MEDICATION: Performed by: EMERGENCY MEDICINE

## 2025-01-21 RX ORDER — IOPAMIDOL 755 MG/ML
100 INJECTION, SOLUTION INTRAVASCULAR
Status: COMPLETED | OUTPATIENT
Start: 2025-01-21 | End: 2025-01-21

## 2025-01-21 RX ADMIN — IOPAMIDOL 100 ML: 755 INJECTION, SOLUTION INTRAVENOUS at 22:20

## 2025-01-21 ASSESSMENT — PAIN DESCRIPTION - LOCATION: LOCATION: ABDOMEN;FLANK

## 2025-01-21 ASSESSMENT — PAIN SCALES - GENERAL: PAINLEVEL_OUTOF10: 8

## 2025-01-21 ASSESSMENT — PAIN - FUNCTIONAL ASSESSMENT: PAIN_FUNCTIONAL_ASSESSMENT: 0-10

## 2025-01-21 ASSESSMENT — PAIN DESCRIPTION - DESCRIPTORS: DESCRIPTORS: ACHING

## 2025-01-22 LAB
EKG ATRIAL RATE: 83 BPM
EKG DIAGNOSIS: NORMAL
EKG P AXIS: 73 DEGREES
EKG P-R INTERVAL: 126 MS
EKG Q-T INTERVAL: 372 MS
EKG QRS DURATION: 74 MS
EKG QTC CALCULATION (BAZETT): 437 MS
EKG R AXIS: 37 DEGREES
EKG T AXIS: 62 DEGREES
EKG VENTRICULAR RATE: 83 BPM

## 2025-01-22 NOTE — ED TRIAGE NOTES
Pt brought in to ED via EMS from home for c/o UTI symptoms. Per EMT pt c/o lower abdominal and bilateral flank pain. Was recently discharged from [facility] of which she was admitted for UTI. Per EMT family at home has COVID.

## 2025-01-22 NOTE — ED PROVIDER NOTES
[facility] EMERGENCY DEPARTMENT  EMERGENCY DEPARTMENT ENCOUNTER      Pt Name: Amanda Barkley  MRN: [id number]  Birthdate 1950  Date of evaluation: 1/21/2025  Provider: Queenie Gilmore MD    CHIEF COMPLAINT       Chief Complaint   Patient presents with    Altered Mental Status    Abdominal Pain    Flank Pain         HISTORY OF PRESENT ILLNESS   (Location/Symptom, Timing/Onset, Context/Setting, Quality, Duration, Modifying Factors, Severity)  Note limiting factors.   The history is provided by the patient and a relative (Grandson).     74-year-old female with a history of CAD, COPD, diabetes, hypertension, hyperlipidemia presenting for evaluation of altered mental status.  Arrives via EMS, report patient patient is here for altered mental status in the setting of recently treated urinary tract infection, state per family she had recently been treated for a urinary tract infection at [facility], has completed her antibiotics, has had recurrence of symptoms with lower abdominal discomfort and flank pain, has been more confused today.  Patient reports generally not feeling well.  Reports no fevers or chills.  Reports nausea vomiting diarrhea.  Grandson at bedside  1 hour after arrival provides additional history reports that patient has been confused today has seemed delirious has been asking repeated questions, seemed more confused than normal, and has not been wanting to eat or drink.  Reports patient has not recently been at [facility], states that the infection that she had in her urine was more than a month ago.  Reports family at home is sick with COVID.    Review of External Medical Records:         Nursing Notes were reviewed.      REVIEW OF SYSTEMS    (2-9 systems for level 4, 10 or more for level 5)     Except as noted above the remainder of the review of systems was reviewed and negative.       PAST MEDICAL HISTORY     Past Medical History:   Diagnosis Date    CAD (coronary

## 2025-01-22 NOTE — ED NOTES
Patient has refused lab draw for initial workup. Dr. Gilmore has been notified. Per Dr. Gilmore, pt's family to be called.

## 2025-01-22 NOTE — ED NOTES
ED SIGN OUT NOTE  Care assumed at [facility] 9:40 PM EST    Patient was signed out to me by Dr. Gilmore.     Patient is awaiting work up.    BP (!) 157/66   Pulse 88   Temp 99 °F (37.2 °C) (Rectal)   Resp 28   Ht 1.676 m (5' 6\")   Wt 86.2 kg (190 lb)   SpO2 95%   BMI 30.67 kg/m²     Labs Reviewed   COVID-19 & INFLUENZA COMBO - Abnormal; Notable for the following components:       Result Value    SARS-CoV-2, PCR Detected (*)     All other components within normal limits   CBC - Abnormal; Notable for the following components:    Platelets 121 (*)     All other components within normal limits   URINALYSIS WITH MICROSCOPIC - Abnormal; Notable for the following components:    Protein,  (*)     Ketones, Urine 40 (*)     Epithelial Cells, UA MODERATE (*)     All other components within normal limits   URINE CULTURE HOLD SAMPLE   COMPREHENSIVE METABOLIC PANEL   ETHANOL   URINE DRUG SCREEN   POCT GLUCOSE   POCT LACTIC ACID     XR CHEST (2 VW)   Final Result   No acute cardiopulmonary disease.         Electronically signed by Catrachito Wilcox MD      CT ABDOMEN PELVIS W IV CONTRAST Additional Contrast? None    (Results Pending)       ED Course as of 01/21/25 2140 Tue Jan 21, 2025 1955 Patient refusing IV placement [SL]   2015 Advised by nursing that family is on the way [SL]   2027 Grandson arrived at bedside, reports that patient has been complaining of runny nose and sore throat and that some of the family members have been diagnosed with COVID [SL]   2059 Patient's grandson also reports that patient was acting delirious today and seemed confused and he could not get her to eat or drink anything.  Reports no temperature was taken at home [SL]   2113 Hemoglobin Quant: 13.1  Handoff to Dr. Blair, awaiting labs, UA,  and CT abdomen/pelvis [SL]   2137 EKG shows sinus rhythm at a rate 83, normal intervals, normal axis, no STEMI [RD]      ED Course User Index  [RD] Anthony Blair MD  [SL] Mando,

## 2025-03-04 ENCOUNTER — OFFICE VISIT (OUTPATIENT)
Age: 75
End: 2025-03-04
Payer: MEDICARE

## 2025-03-04 VITALS
RESPIRATION RATE: 20 BRPM | HEART RATE: 74 BPM | SYSTOLIC BLOOD PRESSURE: 168 MMHG | OXYGEN SATURATION: 97 % | DIASTOLIC BLOOD PRESSURE: 90 MMHG

## 2025-03-04 DIAGNOSIS — R53.1 WEAKNESS OF RIGHT SIDE OF BODY: ICD-10-CM

## 2025-03-04 DIAGNOSIS — I69.30 HISTORY OF STROKE WITH CURRENT RESIDUAL EFFECTS: ICD-10-CM

## 2025-03-04 DIAGNOSIS — Z86.69 HISTORY OF BELL'S PALSY: Primary | ICD-10-CM

## 2025-03-04 PROCEDURE — 1125F AMNT PAIN NOTED PAIN PRSNT: CPT

## 2025-03-04 PROCEDURE — 3017F COLORECTAL CA SCREEN DOC REV: CPT

## 2025-03-04 PROCEDURE — 99204 OFFICE O/P NEW MOD 45 MIN: CPT

## 2025-03-04 PROCEDURE — 1160F RVW MEDS BY RX/DR IN RCRD: CPT

## 2025-03-04 PROCEDURE — 1159F MED LIST DOCD IN RCRD: CPT

## 2025-03-04 PROCEDURE — G8417 CALC BMI ABV UP PARAM F/U: HCPCS

## 2025-03-04 PROCEDURE — 1090F PRES/ABSN URINE INCON ASSESS: CPT

## 2025-03-04 PROCEDURE — 1036F TOBACCO NON-USER: CPT

## 2025-03-04 PROCEDURE — G8400 PT W/DXA NO RESULTS DOC: HCPCS

## 2025-03-04 PROCEDURE — G8427 DOCREV CUR MEDS BY ELIG CLIN: HCPCS

## 2025-03-04 PROCEDURE — 1123F ACP DISCUSS/DSCN MKR DOCD: CPT

## 2025-03-04 PROCEDURE — 3077F SYST BP >= 140 MM HG: CPT

## 2025-03-04 PROCEDURE — 3080F DIAST BP >= 90 MM HG: CPT

## 2025-03-04 NOTE — PROGRESS NOTES
[place] palsy on the left side which has resolved   IT resolved when she transferred from ICU to a regular room she was at the hospital for about 7 days   It has not come back since being discharged       
Arm, Position: Sitting, Cuff Size: Large Adult)   Pulse 74   Resp 20   SpO2 97%     General: Well defined, nourished, and groomed individual in no acute distress.    Neck: Supple, nontender, no bruits, no pain with resistance to active range of motion.    Musculoskeletal: Extremities revealed no edema and had full range of motion of joints.    Psych: Good mood and bright affect    NEUROLOGICAL EXAMINATION:    Mental Status: Alert and oriented   Cranial Nerves:    II, III, IV, VI: Visual acuity grossly intact. Visual fields are normal.    Pupils are equal, round, and reactive to light and accommodation.    Extra-ocular movements are full and fluid. V-XII: Hearing is grossly intact. Facial features are symmetric, with normal sensation and strength. The palate rises symmetrically and the tongue protrudes midline.     Motor Examination: Normal tone, bulk, and strength, 3/5 muscle strength on right and 4/5 on left side.     Coordination: No resting or intention tremor    Gait and Station: Steady while walking with cane. No pronator drift. No muscle wasting or fasiculations noted.     No orders of the defined types were placed in this encounter.       1. History of Bell's palsy    2. History of stroke with current residual effects    3. Weakness of right side of body      Patient with no further symptoms of Bell's palsy.  We discussed that if she has Bell's palsy or stroke like symptoms in the future to go directly to the emergency room so that she can receive treatment.  The patient stated that she understood.    Continue taking aspirin 81 mg daily for stroke prevention and Lipitor 80 mg daily for LDL over 70 with history of stroke (monitored by PCP per patient).  Follow-up with [facility]ist [facility].  Return if symptoms worsen or fail to improve.

## 2025-03-06 ENCOUNTER — HOSPITAL ENCOUNTER (EMERGENCY)
Facility: HOSPITAL | Age: 75
Discharge: HOME | End: 2025-03-06
Attending: EMERGENCY MEDICINE
Payer: MEDICARE

## 2025-03-06 ENCOUNTER — APPOINTMENT (OUTPATIENT)
Dept: VASCULAR SURGERY | Facility: HOSPITAL | Age: 75
End: 2025-03-06
Attending: STUDENT IN AN ORGANIZED HEALTH CARE EDUCATION/TRAINING PROGRAM
Payer: MEDICARE

## 2025-03-06 VITALS
RESPIRATION RATE: 18 BRPM | HEART RATE: 83 BPM | TEMPERATURE: 98 F | SYSTOLIC BLOOD PRESSURE: 210 MMHG | WEIGHT: 149 LBS | BODY MASS INDEX: 23.95 KG/M2 | OXYGEN SATURATION: 100 % | HEIGHT: 66 IN | DIASTOLIC BLOOD PRESSURE: 83 MMHG

## 2025-03-06 DIAGNOSIS — M79.672 BILATERAL FOOT PAIN: ICD-10-CM

## 2025-03-06 DIAGNOSIS — I99.9 VASCULAR PROBLEM: Primary | ICD-10-CM

## 2025-03-06 DIAGNOSIS — M79.671 BILATERAL FOOT PAIN: ICD-10-CM

## 2025-03-06 LAB
ALBUMIN SERPL-MCNC: 3.6 G/DL (ref 3.5–5)
ALBUMIN/GLOB SERPL: 0.9 (ref 1.1–2.2)
ALP SERPL-CCNC: 110 U/L (ref 45–117)
ALT SERPL-CCNC: 21 U/L (ref 12–78)
ANION GAP SERPL CALC-SCNC: 7 MMOL/L (ref 2–12)
AST SERPL-CCNC: 16 U/L (ref 15–37)
BASOPHILS # BLD: 0.03 K/UL (ref 0–0.1)
BASOPHILS NFR BLD: 0.4 % (ref 0–1)
BILIRUB SERPL-MCNC: 0.3 MG/DL (ref 0.2–1)
BUN SERPL-MCNC: 15 MG/DL (ref 6–20)
BUN/CREAT SERPL: 13 (ref 12–20)
CALCIUM SERPL-MCNC: 9.6 MG/DL (ref 8.5–10.1)
CHLORIDE SERPL-SCNC: 100 MMOL/L (ref 97–108)
CO2 SERPL-SCNC: 29 MMOL/L (ref 21–32)
CREAT SERPL-MCNC: 1.12 MG/DL (ref 0.55–1.02)
DIFFERENTIAL METHOD BLD: NORMAL
EOSINOPHIL # BLD: 0.08 K/UL (ref 0–0.4)
EOSINOPHIL NFR BLD: 1.1 % (ref 0–7)
ERYTHROCYTE [DISTWIDTH] IN BLOOD BY AUTOMATED COUNT: 13.9 % (ref 11.5–14.5)
GLOBULIN SER CALC-MCNC: 4.2 G/DL (ref 2–4)
GLUCOSE SERPL-MCNC: 305 MG/DL (ref 65–100)
HCT VFR BLD AUTO: 40 % (ref 35–47)
HGB BLD-MCNC: 13.4 G/DL (ref 11.5–16)
IMM GRANULOCYTES # BLD AUTO: 0.01 K/UL (ref 0–0.04)
IMM GRANULOCYTES NFR BLD AUTO: 0.1 % (ref 0–0.5)
LYMPHOCYTES # BLD: 1.63 K/UL (ref 0.8–3.5)
LYMPHOCYTES NFR BLD: 22.8 % (ref 12–49)
MCH RBC QN AUTO: 28.1 PG (ref 26–34)
MCHC RBC AUTO-ENTMCNC: 33.5 G/DL (ref 30–36.5)
MCV RBC AUTO: 83.9 FL (ref 80–99)
MONOCYTES # BLD: 0.37 K/UL (ref 0–1)
MONOCYTES NFR BLD: 5.2 % (ref 5–13)
NEUTS SEG # BLD: 5.03 K/UL (ref 1.8–8)
NEUTS SEG NFR BLD: 70.4 % (ref 32–75)
NRBC # BLD: 0 K/UL (ref 0–0.01)
NRBC BLD-RTO: 0 PER 100 WBC
PLATELET # BLD AUTO: 218 K/UL (ref 150–400)
PMV BLD AUTO: 11 FL (ref 8.9–12.9)
POTASSIUM SERPL-SCNC: 3.5 MMOL/L (ref 3.5–5.1)
PROT SERPL-MCNC: 7.8 G/DL (ref 6.4–8.2)
RBC # BLD AUTO: 4.77 M/UL (ref 3.8–5.2)
SODIUM SERPL-SCNC: 136 MMOL/L (ref 136–145)
WBC # BLD AUTO: 7.2 K/UL (ref 3.6–11)

## 2025-03-06 PROCEDURE — 93922 UPR/L XTREMITY ART 2 LEVELS: CPT

## 2025-03-06 PROCEDURE — 99284 EMERGENCY DEPT VISIT MOD MDM: CPT

## 2025-03-06 PROCEDURE — 85025 COMPLETE CBC W/AUTO DIFF WBC: CPT

## 2025-03-06 PROCEDURE — 36415 COLL VENOUS BLD VENIPUNCTURE: CPT

## 2025-03-06 PROCEDURE — 80053 COMPREHEN METABOLIC PANEL: CPT

## 2025-03-06 ASSESSMENT — PAIN DESCRIPTION - DESCRIPTORS: DESCRIPTORS: BURNING

## 2025-03-06 ASSESSMENT — PAIN SCALES - GENERAL: PAINLEVEL_OUTOF10: 9

## 2025-03-06 ASSESSMENT — PAIN DESCRIPTION - ORIENTATION: ORIENTATION: RIGHT;LEFT

## 2025-03-06 ASSESSMENT — PAIN DESCRIPTION - LOCATION: LOCATION: FOOT

## 2025-03-06 ASSESSMENT — PAIN - FUNCTIONAL ASSESSMENT: PAIN_FUNCTIONAL_ASSESSMENT: 0-10

## 2025-03-06 NOTE — ED PROVIDER NOTES
AOx3     No wounds noted, no diabetic ulcers or concern for osteomyelitis. Appears to be perfusing and no significant changes over 3 months, just slowly worsening. Labs unremarkable, Suly obtained showing severe decrease on the L and mild on the R. Discussed with vascular, no indication for emergent intervention nor additional imaging. They would like to see her in the clinic tomorrow.     Discussed with patient, her pain is tolerable and again not significantly changed over the last 3 months. Dopplerable signals b/l DP and mild neuropathy that is unchanged. She is agreeable with discharge and outpatient followup. I reiterated to her and to her grandson at bedside that this is not something that can wait another week for her to make an appointment at her leisure, as she is almost a week since her podiatrist recommended she come to the ER for evaluation. She is agreeable to follow up expediently tomorrow and will return for any changes.   Low suspicion for more sinister process given benign exam and reassuring workup. Will have them follow up, close return precautions discussed, they are agreeable.     Diagnosis is vascular problem, bilateral foot pain. Disposition is Discharge with PCP and vascular follow-up. Workup and plan discussed with patient  and family , all questions answered and they are in agreement with the  plan .                    Total critical care time (not including time spent performing separately reportable procedures):         Review of external notes and Independent historians utilized in decision making: External notes reviewed includeOutside medication list     Diagnostics independently interpreted by me: None    Discussions with other clinicians and healthcare agents:  Consultant vascular surgery who has recommended outpatient follow up tomorrow, no additional imaging    Risks considered in patient's treatment plan: N/A        HISTORY OF PRESENT ILLNESS   (Location/Symptom, Timing/Onset,

## 2025-03-06 NOTE — DISCHARGE INSTRUCTIONS
Right side findings: Resting CLOTILDE is 0.97. This is mildly decreased. Resting TBI is 1.05.    Left side findings: Resting CLOTILDE is 0.49. This is severely decreased. Resting TBI is 0.28. Severely decreased resting left TBI.

## 2025-03-06 NOTE — ED TRIAGE NOTES
Patient arrives to ed via pov with c/o bilateral feet pain x 3 months with swelling and pt saw podiatrist and due to the continued pain and needing to make sure there isnt a blockage or infection. Patient sts her grandson has been rubbing ointment on her feet nightly for a month with some improvement.

## 2025-03-07 LAB
ECHO BSA: 1.77 M2
VAS LEFT ABI: 0.49
VAS LEFT ARM BP: 195 MMHG
VAS LEFT DORSALIS PEDIS BP: 98 MMHG
VAS LEFT PTA BP: 90 MMHG
VAS LEFT TBI: 0.28
VAS LEFT TOE PRESSURE: 56 MMHG
VAS RIGHT ABI: 0.97
VAS RIGHT ARM BP: 198 MMHG
VAS RIGHT DORSALIS PEDIS BP: 193 MMHG
VAS RIGHT PTA BP: 193 MMHG
VAS RIGHT TBI: 1.05
VAS RIGHT TOE PRESSURE: 208 MMHG